# Patient Record
Sex: FEMALE | Race: BLACK OR AFRICAN AMERICAN | NOT HISPANIC OR LATINO
[De-identification: names, ages, dates, MRNs, and addresses within clinical notes are randomized per-mention and may not be internally consistent; named-entity substitution may affect disease eponyms.]

---

## 2017-08-10 ENCOUNTER — RESULT REVIEW (OUTPATIENT)
Age: 67
End: 2017-08-10

## 2018-08-06 ENCOUNTER — OUTPATIENT (OUTPATIENT)
Dept: OUTPATIENT SERVICES | Facility: HOSPITAL | Age: 68
LOS: 1 days | End: 2018-08-06
Payer: COMMERCIAL

## 2018-08-06 VITALS
TEMPERATURE: 98 F | HEIGHT: 66 IN | WEIGHT: 215.17 LBS | SYSTOLIC BLOOD PRESSURE: 139 MMHG | RESPIRATION RATE: 15 BRPM | OXYGEN SATURATION: 97 % | HEART RATE: 64 BPM | DIASTOLIC BLOOD PRESSURE: 87 MMHG

## 2018-08-06 VITALS — WEIGHT: 215.17 LBS | HEIGHT: 66 IN

## 2018-08-06 DIAGNOSIS — M25.9 JOINT DISORDER, UNSPECIFIED: Chronic | ICD-10-CM

## 2018-08-06 DIAGNOSIS — M77.31 CALCANEAL SPUR, RIGHT FOOT: ICD-10-CM

## 2018-08-06 DIAGNOSIS — Z98.89 OTHER SPECIFIED POSTPROCEDURAL STATES: Chronic | ICD-10-CM

## 2018-08-06 DIAGNOSIS — D16.31 BENIGN NEOPLASM OF SHORT BONES OF RIGHT LOWER LIMB: ICD-10-CM

## 2018-08-06 DIAGNOSIS — Z90.49 ACQUIRED ABSENCE OF OTHER SPECIFIED PARTS OF DIGESTIVE TRACT: Chronic | ICD-10-CM

## 2018-08-06 DIAGNOSIS — Z90.710 ACQUIRED ABSENCE OF BOTH CERVIX AND UTERUS: Chronic | ICD-10-CM

## 2018-08-06 DIAGNOSIS — M85.671 OTHER CYST OF BONE, RIGHT ANKLE AND FOOT: ICD-10-CM

## 2018-08-06 DIAGNOSIS — Z98.890 OTHER SPECIFIED POSTPROCEDURAL STATES: Chronic | ICD-10-CM

## 2018-08-06 DIAGNOSIS — Z01.818 ENCOUNTER FOR OTHER PREPROCEDURAL EXAMINATION: ICD-10-CM

## 2018-08-06 LAB
ALBUMIN SERPL ELPH-MCNC: 3.6 G/DL — SIGNIFICANT CHANGE UP (ref 3.3–5)
ALP SERPL-CCNC: 89 U/L — SIGNIFICANT CHANGE UP (ref 40–120)
ALT FLD-CCNC: 21 U/L DA — SIGNIFICANT CHANGE UP (ref 10–45)
ANION GAP SERPL CALC-SCNC: 5 MMOL/L — SIGNIFICANT CHANGE UP (ref 5–17)
AST SERPL-CCNC: 36 U/L — SIGNIFICANT CHANGE UP (ref 10–40)
BILIRUB SERPL-MCNC: 0.5 MG/DL — SIGNIFICANT CHANGE UP (ref 0.2–1.2)
BUN SERPL-MCNC: 21 MG/DL — SIGNIFICANT CHANGE UP (ref 7–23)
CALCIUM SERPL-MCNC: 9.8 MG/DL — SIGNIFICANT CHANGE UP (ref 8.4–10.5)
CHLORIDE SERPL-SCNC: 112 MMOL/L — HIGH (ref 96–108)
CO2 SERPL-SCNC: 26 MMOL/L — SIGNIFICANT CHANGE UP (ref 22–31)
CREAT SERPL-MCNC: 1.01 MG/DL — SIGNIFICANT CHANGE UP (ref 0.5–1.3)
GLUCOSE SERPL-MCNC: 79 MG/DL — SIGNIFICANT CHANGE UP (ref 70–99)
HCT VFR BLD CALC: 44.6 % — SIGNIFICANT CHANGE UP (ref 34.5–45)
HGB BLD-MCNC: 13.6 G/DL — SIGNIFICANT CHANGE UP (ref 11.5–15.5)
MCHC RBC-ENTMCNC: 27.7 PG — SIGNIFICANT CHANGE UP (ref 27–34)
MCHC RBC-ENTMCNC: 30.5 GM/DL — LOW (ref 32–36)
MCV RBC AUTO: 90.8 FL — SIGNIFICANT CHANGE UP (ref 80–100)
PLATELET # BLD AUTO: 183 K/UL — SIGNIFICANT CHANGE UP (ref 150–400)
POTASSIUM SERPL-MCNC: 6.2 MMOL/L — CRITICAL HIGH (ref 3.5–5.3)
POTASSIUM SERPL-SCNC: 6.2 MMOL/L — CRITICAL HIGH (ref 3.5–5.3)
PROT SERPL-MCNC: 8.4 G/DL — HIGH (ref 6–8.3)
RBC # BLD: 4.91 M/UL — SIGNIFICANT CHANGE UP (ref 3.8–5.2)
RBC # FLD: 14.3 % — SIGNIFICANT CHANGE UP (ref 10.3–14.5)
SODIUM SERPL-SCNC: 143 MMOL/L — SIGNIFICANT CHANGE UP (ref 135–145)
WBC # BLD: 6.2 K/UL — SIGNIFICANT CHANGE UP (ref 3.8–10.5)
WBC # FLD AUTO: 6.2 K/UL — SIGNIFICANT CHANGE UP (ref 3.8–10.5)

## 2018-08-06 PROCEDURE — 85027 COMPLETE CBC AUTOMATED: CPT

## 2018-08-06 PROCEDURE — 36415 COLL VENOUS BLD VENIPUNCTURE: CPT

## 2018-08-06 PROCEDURE — 93010 ELECTROCARDIOGRAM REPORT: CPT | Mod: NC

## 2018-08-06 PROCEDURE — G0463: CPT

## 2018-08-06 PROCEDURE — 80053 COMPREHEN METABOLIC PANEL: CPT

## 2018-08-06 PROCEDURE — 93005 ELECTROCARDIOGRAM TRACING: CPT

## 2018-08-06 RX ORDER — SODIUM CHLORIDE 9 MG/ML
1000 INJECTION, SOLUTION INTRAVENOUS
Qty: 0 | Refills: 0 | Status: DISCONTINUED | OUTPATIENT
Start: 2018-08-17 | End: 2018-08-17

## 2018-08-06 NOTE — H&P PST ADULT - ATTENDING COMMENTS
Physician Assistant Statement 8-17-18  I have personally interviewed the patient.  There have been no changes in the patient 's history or in her review of systems since her exam in [facility] 's and since her medical clearance.  KATJA Bowman P.A.-C

## 2018-08-06 NOTE — H&P PST ADULT - PROBLEM SELECTOR PLAN 1
Scheduled for retrocalcaneal spur resection right foot on 8/17/18.    Pre-op labs and EKG ordered. Obtain medical and pulmonary clearances.  Bring asthma inhalers. Follow PCP order regarding medication intake.

## 2018-08-06 NOTE — H&P PST ADULT - PSH
H/O abdominal hysterectomy  1998  H/O ventral hernia repair    History of bunionectomy of right great toe    S/P cholecystectomy    Shoulder problem  right shoulder repaired

## 2018-08-06 NOTE — H&P PST ADULT - FAMILY HISTORY
Mother  Still living? No  Family history of premature coronary heart disease, Age at diagnosis: Age Unknown

## 2018-08-06 NOTE — PROVIDER CONTACT NOTE (OTHER) - ACTION/TREATMENT ORDERED:
Spoke with Amber in Dr. Carias's office who will inform the doctor of blood being hemolized. Blood work to be repeated in doctor's office on 8/7/18. Spoke with Amber in Dr. Carias's office who will inform the doctor of blood being hemolized. Blood work to be repeated in doctor's office on 8/7/18.  8/15/18 repeat k done by PMD and resulted 4.7.

## 2018-08-06 NOTE — H&P PST ADULT - HISTORY OF PRESENT ILLNESS
68 y/o female presents to PST.  Reports right foot pain for over a year.  Pain radiates up right foot 8/10 and described as sharp.  Referred to Dr Larson by her podiatrist.  Diagnosed with right heel spur.  Scheduled for retrocalcaneal spur resection right foot on 8/17/18.

## 2018-08-06 NOTE — H&P PST ADULT - PMH
Asthma  3-4 x/months.  Last attack 2 weeks ago  Calcaneal spur of foot, right    DM (diabetes mellitus screen)    Gastroesophageal reflux disease, esophagitis presence not specified    Glaucoma of both eyes, unspecified glaucoma type    Hyperlipidemia, unspecified hyperlipidemia type    Hypertension, unspecified type    Sleep apnea, unspecified type

## 2018-08-16 ENCOUNTER — TRANSCRIPTION ENCOUNTER (OUTPATIENT)
Age: 68
End: 2018-08-16

## 2018-08-17 ENCOUNTER — OUTPATIENT (OUTPATIENT)
Dept: OUTPATIENT SERVICES | Facility: HOSPITAL | Age: 68
LOS: 1 days | End: 2018-08-17
Payer: COMMERCIAL

## 2018-08-17 ENCOUNTER — RESULT REVIEW (OUTPATIENT)
Age: 68
End: 2018-08-17

## 2018-08-17 VITALS
HEART RATE: 71 BPM | TEMPERATURE: 97 F | DIASTOLIC BLOOD PRESSURE: 72 MMHG | SYSTOLIC BLOOD PRESSURE: 106 MMHG | RESPIRATION RATE: 16 BRPM | OXYGEN SATURATION: 96 %

## 2018-08-17 VITALS
RESPIRATION RATE: 16 BRPM | HEART RATE: 70 BPM | SYSTOLIC BLOOD PRESSURE: 130 MMHG | DIASTOLIC BLOOD PRESSURE: 77 MMHG | HEIGHT: 66 IN | TEMPERATURE: 98 F | WEIGHT: 215.17 LBS | OXYGEN SATURATION: 98 %

## 2018-08-17 DIAGNOSIS — Z90.49 ACQUIRED ABSENCE OF OTHER SPECIFIED PARTS OF DIGESTIVE TRACT: Chronic | ICD-10-CM

## 2018-08-17 DIAGNOSIS — Z90.710 ACQUIRED ABSENCE OF BOTH CERVIX AND UTERUS: Chronic | ICD-10-CM

## 2018-08-17 DIAGNOSIS — M25.9 JOINT DISORDER, UNSPECIFIED: Chronic | ICD-10-CM

## 2018-08-17 DIAGNOSIS — D16.31 BENIGN NEOPLASM OF SHORT BONES OF RIGHT LOWER LIMB: ICD-10-CM

## 2018-08-17 DIAGNOSIS — M85.671 OTHER CYST OF BONE, RIGHT ANKLE AND FOOT: ICD-10-CM

## 2018-08-17 DIAGNOSIS — M77.31 CALCANEAL SPUR, RIGHT FOOT: ICD-10-CM

## 2018-08-17 DIAGNOSIS — Z98.42 CATARACT EXTRACTION STATUS, LEFT EYE: Chronic | ICD-10-CM

## 2018-08-17 DIAGNOSIS — Z98.41 CATARACT EXTRACTION STATUS, RIGHT EYE: Chronic | ICD-10-CM

## 2018-08-17 DIAGNOSIS — Z98.89 OTHER SPECIFIED POSTPROCEDURAL STATES: Chronic | ICD-10-CM

## 2018-08-17 DIAGNOSIS — Z98.890 OTHER SPECIFIED POSTPROCEDURAL STATES: Chronic | ICD-10-CM

## 2018-08-17 LAB
GLUCOSE BLDC GLUCOMTR-MCNC: 128 MG/DL — HIGH (ref 70–99)
GLUCOSE BLDC GLUCOMTR-MCNC: 161 MG/DL — HIGH (ref 70–99)

## 2018-08-17 PROCEDURE — C1713: CPT

## 2018-08-17 PROCEDURE — 88304 TISSUE EXAM BY PATHOLOGIST: CPT

## 2018-08-17 PROCEDURE — 76000 FLUOROSCOPY <1 HR PHYS/QHP: CPT

## 2018-08-17 PROCEDURE — 73620 X-RAY EXAM OF FOOT: CPT | Mod: 26,RT

## 2018-08-17 PROCEDURE — 88311 DECALCIFY TISSUE: CPT

## 2018-08-17 PROCEDURE — 73620 X-RAY EXAM OF FOOT: CPT

## 2018-08-17 PROCEDURE — 88311 DECALCIFY TISSUE: CPT | Mod: 26

## 2018-08-17 PROCEDURE — 28119 REMOVAL OF HEEL SPUR: CPT | Mod: RT

## 2018-08-17 PROCEDURE — 88304 TISSUE EXAM BY PATHOLOGIST: CPT | Mod: 26

## 2018-08-17 PROCEDURE — 82962 GLUCOSE BLOOD TEST: CPT

## 2018-08-17 RX ORDER — HYDROMORPHONE HYDROCHLORIDE 2 MG/ML
0.5 INJECTION INTRAMUSCULAR; INTRAVENOUS; SUBCUTANEOUS
Qty: 0 | Refills: 0 | Status: DISCONTINUED | OUTPATIENT
Start: 2018-08-17 | End: 2018-08-17

## 2018-08-17 RX ORDER — ONDANSETRON 8 MG/1
4 TABLET, FILM COATED ORAL ONCE
Qty: 0 | Refills: 0 | Status: DISCONTINUED | OUTPATIENT
Start: 2018-08-17 | End: 2018-08-17

## 2018-08-17 RX ORDER — HYDROMORPHONE HYDROCHLORIDE 2 MG/ML
1 INJECTION INTRAMUSCULAR; INTRAVENOUS; SUBCUTANEOUS
Qty: 0 | Refills: 0 | Status: DISCONTINUED | OUTPATIENT
Start: 2018-08-17 | End: 2018-08-17

## 2018-08-17 RX ORDER — SODIUM CHLORIDE 9 MG/ML
1000 INJECTION, SOLUTION INTRAVENOUS
Qty: 0 | Refills: 0 | Status: DISCONTINUED | OUTPATIENT
Start: 2018-08-17 | End: 2018-08-17

## 2018-08-17 RX ADMIN — SODIUM CHLORIDE 50 MILLILITER(S): 9 INJECTION, SOLUTION INTRAVENOUS at 07:40

## 2018-08-17 NOTE — BRIEF OPERATIVE NOTE - PROCEDURE
<<-----Click on this checkbox to enter Procedure Resection of spur of right calcaneus  08/17/2018    Active  EULOGIO

## 2018-08-17 NOTE — ASU PATIENT PROFILE, ADULT - PSH
H/O abdominal hysterectomy  1998  H/O ventral hernia repair    History of bunionectomy of right great toe    History of left cataract extraction    History of right cataract extraction    S/P cholecystectomy    Shoulder problem  right shoulder repaired

## 2018-08-17 NOTE — ASU DISCHARGE PLAN (ADULT/PEDIATRIC). - NOTIFY
Persistent Nausea and Vomiting/Numbness, color, or temperature change to extremity Pain not relieved by Medications/Fever greater than 101/Numbness, tingling/Numbness, color, or temperature change to extremity/Swelling that continues/Persistent Nausea and Vomiting

## 2018-08-17 NOTE — ASU DISCHARGE PLAN (ADULT/PEDIATRIC). - MEDICATION SUMMARY - MEDICATIONS TO TAKE
I will START or STAY ON the medications listed below when I get home from the hospital:    aspirin 81 mg oral tablet  -- 1 tab(s) by mouth once a day  -- Indication: For as per PCP    Cy Mitchellar  -- 52 unit(s) subcutaneous once a day (at bedtime)  -- Indication: For as per PCP    metFORMIN 1000 mg oral tablet  -- orally once a day (at bedtime)  -- Indication: For as per PCP    HumaLOG 100 units/mL subcutaneous solution  -- 8 unit(s) subcutaneous once a day  -- Indication: For as per PCP    HumaLOG 100 units/mL subcutaneous solution  -- 10 unit(s) subcutaneous once a day, dinner  -- Indication: For as per PCP    Jardiance 25 mg oral tablet  -- 1 tab(s) by mouth once a day (in the morning)  -- Indication: For as per PCP    Lipitor 20 mg oral tablet  -- 1 tab(s) by mouth once a day  -- Indication: For as per PCP    Lotrel  --  by mouth once a day  -- Indication: For as per PCP    metoprolol succinate 100 mg oral tablet, extended release  -- 1 tab(s) by mouth once a day  -- Indication: For as per PCP    Dulera 200 mcg-5 mcg/inh inhalation aerosol  -- 2 puff(s) inhaled 2 times a day  -- Indication: For as per PCP    ProAir HFA 90 mcg/inh inhalation aerosol  -- 2 puff(s) inhaled 4 times a day  -- Indication: For as per PCP    Lasix 40 mg oral tablet  -- 1 tab(s) by mouth once a day  -- Indication: For as per PCP    raNITIdine 300 mg oral capsule  -- 1 cap(s) by mouth once a day (at bedtime)  -- Indication: For as per PCP    montelukast 10 mg oral tablet  -- 1 tab(s) by mouth once a day  -- Indication: For as per PCP    Klor-Con M10 oral tablet, extended release  -- 1 tab(s) by mouth once a day (at bedtime)  -- Indication: For as per PCP    Fish Oil 1000 mg oral capsule  -- 1 cap(s) by mouth 2 times a day  -- Indication: For as per PCP    latanoprost 0.005% ophthalmic solution  -- 1 drop(s) to each affected eye once a day (in the evening)  -- Indication: For as per PCP    omeprazole 20 mg oral delayed release capsule  -- 1 cap(s) by mouth once a day  -- Indication: For as per PCP

## 2018-08-17 NOTE — ASU PREOP CHECKLIST - 1.
Popliteal nerve block by Anesthesia Popliteal nerve block by Anesthesia (see anesthesia record and additional time out OR sheet)

## 2018-08-22 LAB — SURGICAL PATHOLOGY STUDY: SIGNIFICANT CHANGE UP

## 2019-02-14 NOTE — H&P PST ADULT - PSYCHIATRIC
Problem: Patient Care Overview  Goal: Plan of Care Review  Outcome: Ongoing (interventions implemented as appropriate)   02/14/19 0235   Plan of Care Review   Progress improving   Coping/Psychosocial   Plan of Care Reviewed With   02/14/19 0235   Plan of Care Review   Progress improving   Coping/Psychosocial   Plan of Care Reviewed With patient    patient       Problem: Hip Arthroplasty (Total, Partial) (Adult)  Goal: Signs and Symptoms of Listed Potential Problems Will be Absent, Minimized or Managed (Hip Arthroplasty)  Outcome: Ongoing (interventions implemented as appropriate)   02/14/19 0235   Goal/Outcome Evaluation   Problems Assessed (Hip Arthroplasty) all   Problems Present (Hip Arthroplasty) pain;functional deficit          negative Affect and characteristics of appearance, verbalizations, behaviors are appropriate

## 2019-10-01 ENCOUNTER — INPATIENT (INPATIENT)
Facility: HOSPITAL | Age: 69
LOS: 3 days | Discharge: HOME | End: 2019-10-05
Attending: INTERNAL MEDICINE | Admitting: INTERNAL MEDICINE
Payer: COMMERCIAL

## 2019-10-01 VITALS
DIASTOLIC BLOOD PRESSURE: 74 MMHG | TEMPERATURE: 98 F | SYSTOLIC BLOOD PRESSURE: 143 MMHG | OXYGEN SATURATION: 99 % | RESPIRATION RATE: 18 BRPM | HEART RATE: 94 BPM

## 2019-10-01 DIAGNOSIS — M25.9 JOINT DISORDER, UNSPECIFIED: Chronic | ICD-10-CM

## 2019-10-01 DIAGNOSIS — Z90.710 ACQUIRED ABSENCE OF BOTH CERVIX AND UTERUS: Chronic | ICD-10-CM

## 2019-10-01 DIAGNOSIS — Z98.890 OTHER SPECIFIED POSTPROCEDURAL STATES: Chronic | ICD-10-CM

## 2019-10-01 DIAGNOSIS — Z98.89 OTHER SPECIFIED POSTPROCEDURAL STATES: Chronic | ICD-10-CM

## 2019-10-01 DIAGNOSIS — Z98.42 CATARACT EXTRACTION STATUS, LEFT EYE: Chronic | ICD-10-CM

## 2019-10-01 DIAGNOSIS — Z90.49 ACQUIRED ABSENCE OF OTHER SPECIFIED PARTS OF DIGESTIVE TRACT: Chronic | ICD-10-CM

## 2019-10-01 DIAGNOSIS — Z98.41 CATARACT EXTRACTION STATUS, RIGHT EYE: Chronic | ICD-10-CM

## 2019-10-01 RX ORDER — PANTOPRAZOLE SODIUM 20 MG/1
80 TABLET, DELAYED RELEASE ORAL ONCE
Refills: 0 | Status: COMPLETED | OUTPATIENT
Start: 2019-10-01 | End: 2019-10-02

## 2019-10-01 NOTE — ED PROVIDER NOTE - CLINICAL SUMMARY MEDICAL DECISION MAKING FREE TEXT BOX
69yF w/pmhx DM, HTN, HLD, GERD, asthma p/w abdominal pain and bloody bowel movements x last night. Concern for upper GI bleed, will check cbc/cmp/lipase, occult blood, CT abd/pelvis given abdominal pain to r/o colitis vs diverticulitis. Will give protonic 80mg 69yF w/pmhx DM, HTN, HLD, GERD, asthma p/w abdominal pain and bloody bowel movements x last night. Concern for GI bleed, gross blood on rectal exam. will check cbc/cmp/lipase, occult blood, CT abd/pelvis given abdominal pain to r/o colitis vs diverticulitis. Will give protonic 80mg loading dose

## 2019-10-01 NOTE — ED ADULT TRIAGE NOTE - CHIEF COMPLAINT QUOTE
Pt c/o lower abdominal pain, nausea/vomiting/diarrhea.  States symptoms for past 2 days.  Today believes saw some blood tinged emesis and blood in stool.  PMHx HLD, HTN, DM2, on ASA 81mg.  Hx stomach surgeries; hysterectomy & gall bladder removal.  Pt denies fevers/chills, recent travel, sick contacts.  Pt states was on antibiotic eyedrops last week for conjunctivitis, no oral antibiotic use.

## 2019-10-01 NOTE — ED PROVIDER NOTE - ATTENDING CONTRIBUTION TO CARE
Dr. Nye: I performed a face to face bedside interview with patient regarding history of present illness, review of symptoms and past medical history. I completed an independent physical exam.  I have discussed patient's plan of care with PA.   I agree with note as stated above, having amended the EMR as needed to reflect my findings.   This includes HISTORY OF PRESENT ILLNESS, HIV, PAST MEDICAL/SURGICAL/FAMILY/SOCIAL HISTORY, ALLERGIES AND HOME MEDICATIONS, REVIEW OF SYSTEMS, PHYSICAL EXAM, and any PROGRESS NOTES during the time I functioned as the attending physician for this patient.      69F with pmh of cholecystectomy, hysterecotmy, hernia repair p/w  diarrhea, vomiting, noted dark blood in stool and also

## 2019-10-01 NOTE — ED PROVIDER NOTE - OBJECTIVE STATEMENT
69yF w/pmhx DM, HTN, HLD, asthma, GERD p/w abdominal pain and BRBPR. Pt states last night she developed nausea, vomiting, diarrhea and lower abdominal pain. Pt states she has had multiple episodes of diarrhea with the most recent two being only blood, she states it is dark red in color without feces mixed in. She states she has had 3 total episodes of emesis and last episode tonight looked the same as her bloody BM. Pt states she has lower abdominal pain, crampy in nature. Associated she has dizziness/lightheadedness. Reports normal colonoscopy 3-4 years ago. Pt denies fever/chills, sick contacts, recent travel, back pain, chest pain, shortness of breath, palpitations, dysuria, urinary frequency or any other concerns.  Pt recently used abx drops for conjunctivitis, no oral abx 69yF w/pmhx DM, HTN, HLD, asthma, GERD p/w abdominal pain and bloody bowel movements. Pt states last night she developed nausea, vomiting, diarrhea and lower abdominal pain. Pt states she has had multiple episodes of diarrhea with the most recent two being only blood, she states it is dark red in color without feces mixed in. She states she has had 3 total episodes of emesis and last episode tonight looked the same as her bloody BM. Pt states she has lower abdominal pain, crampy in nature. Associated she has dizziness/lightheadedness. Reports normal colonoscopy 3-4 years ago. Pt denies fever/chills, sick contacts, recent travel, back pain, chest pain, shortness of breath, palpitations, dysuria, urinary frequency or any other concerns.  Pt recently used abx drops for conjunctivitis, no oral abx 69yF w/pmhx DM, HTN, HLD, asthma, GERD p/w abdominal pain and bloody bowel movements. Pt states last night she developed nausea, vomiting, diarrhea and lower abdominal pain. Pt states she has had multiple episodes of diarrhea with the most recent two being only blood, she states it is dark red in color without feces mixed in. She states she has had 3 total episodes of emesis and last episode tonight looked the same as her bloody BM. Pt states she has lower abdominal pain, crampy in nature. Associated she has dizziness/lightheadedness. Reports normal colonoscopy 3-4 years ago. Pt denies fever/chills, sick contacts, recent travel, back pain, chest pain, shortness of breath, palpitations, dysuria, urinary frequency or any other concerns.  Pt recently used abx drops for conjunctivitis?, no oral abx

## 2019-10-01 NOTE — ED PROVIDER NOTE - PROGRESS NOTE DETAILS
RONALDO Arredondo: Pt signed out to RONALDO Gunn pending lab and imaging results, pt to be admitted for GI bleed

## 2019-10-02 DIAGNOSIS — Z71.89 OTHER SPECIFIED COUNSELING: ICD-10-CM

## 2019-10-02 DIAGNOSIS — K52.9 NONINFECTIVE GASTROENTERITIS AND COLITIS, UNSPECIFIED: ICD-10-CM

## 2019-10-02 DIAGNOSIS — I10 ESSENTIAL (PRIMARY) HYPERTENSION: ICD-10-CM

## 2019-10-02 DIAGNOSIS — E66.9 OBESITY, UNSPECIFIED: ICD-10-CM

## 2019-10-02 DIAGNOSIS — G47.33 OBSTRUCTIVE SLEEP APNEA (ADULT) (PEDIATRIC): ICD-10-CM

## 2019-10-02 DIAGNOSIS — Z13.1 ENCOUNTER FOR SCREENING FOR DIABETES MELLITUS: ICD-10-CM

## 2019-10-02 DIAGNOSIS — J45.909 UNSPECIFIED ASTHMA, UNCOMPLICATED: ICD-10-CM

## 2019-10-02 PROBLEM — M77.31 CALCANEAL SPUR, RIGHT FOOT: Chronic | Status: ACTIVE | Noted: 2018-08-06

## 2019-10-02 PROBLEM — G47.30 SLEEP APNEA, UNSPECIFIED: Chronic | Status: ACTIVE | Noted: 2018-08-06

## 2019-10-02 PROBLEM — K21.9 GASTRO-ESOPHAGEAL REFLUX DISEASE WITHOUT ESOPHAGITIS: Chronic | Status: ACTIVE | Noted: 2018-08-06

## 2019-10-02 PROBLEM — E78.5 HYPERLIPIDEMIA, UNSPECIFIED: Chronic | Status: ACTIVE | Noted: 2018-08-06

## 2019-10-02 PROBLEM — H40.9 UNSPECIFIED GLAUCOMA: Chronic | Status: ACTIVE | Noted: 2018-08-06

## 2019-10-02 LAB
ALBUMIN SERPL ELPH-MCNC: 4.3 G/DL — SIGNIFICANT CHANGE UP (ref 3.3–5)
ALP SERPL-CCNC: 92 U/L — SIGNIFICANT CHANGE UP (ref 40–120)
ALT FLD-CCNC: 15 U/L — SIGNIFICANT CHANGE UP (ref 4–33)
ANION GAP SERPL CALC-SCNC: 17 MMO/L — HIGH (ref 7–14)
APTT BLD: 28.6 SEC — SIGNIFICANT CHANGE UP (ref 27.5–36.3)
AST SERPL-CCNC: 24 U/L — SIGNIFICANT CHANGE UP (ref 4–32)
BASE EXCESS BLDV CALC-SCNC: 1.2 MMOL/L — SIGNIFICANT CHANGE UP
BASOPHILS # BLD AUTO: 0.07 K/UL — SIGNIFICANT CHANGE UP (ref 0–0.2)
BASOPHILS NFR BLD AUTO: 0.6 % — SIGNIFICANT CHANGE UP (ref 0–2)
BILIRUB SERPL-MCNC: 0.4 MG/DL — SIGNIFICANT CHANGE UP (ref 0.2–1.2)
BLD GP AB SCN SERPL QL: NEGATIVE — SIGNIFICANT CHANGE UP
BLOOD GAS VENOUS - CREATININE: 1.22 MG/DL — SIGNIFICANT CHANGE UP (ref 0.5–1.3)
BLOOD GAS VENOUS - FIO2: 21 — SIGNIFICANT CHANGE UP
BUN SERPL-MCNC: 17 MG/DL — SIGNIFICANT CHANGE UP (ref 7–23)
CALCIUM SERPL-MCNC: 10.5 MG/DL — SIGNIFICANT CHANGE UP (ref 8.4–10.5)
CHLORIDE BLDV-SCNC: 112 MMOL/L — HIGH (ref 96–108)
CHLORIDE SERPL-SCNC: 108 MMOL/L — HIGH (ref 98–107)
CO2 SERPL-SCNC: 20 MMOL/L — LOW (ref 22–31)
CREAT SERPL-MCNC: 1.09 MG/DL — SIGNIFICANT CHANGE UP (ref 0.5–1.3)
EOSINOPHIL # BLD AUTO: 0.17 K/UL — SIGNIFICANT CHANGE UP (ref 0–0.5)
EOSINOPHIL NFR BLD AUTO: 1.4 % — SIGNIFICANT CHANGE UP (ref 0–6)
GAS PNL BLDV: 143 MMOL/L — SIGNIFICANT CHANGE UP (ref 136–146)
GLUCOSE BLDC GLUCOMTR-MCNC: 115 MG/DL — HIGH (ref 70–99)
GLUCOSE BLDC GLUCOMTR-MCNC: 117 MG/DL — HIGH (ref 70–99)
GLUCOSE BLDC GLUCOMTR-MCNC: 129 MG/DL — HIGH (ref 70–99)
GLUCOSE BLDC GLUCOMTR-MCNC: 168 MG/DL — HIGH (ref 70–99)
GLUCOSE BLDV-MCNC: 120 MG/DL — HIGH (ref 70–99)
GLUCOSE SERPL-MCNC: 155 MG/DL — HIGH (ref 70–99)
HCO3 BLDV-SCNC: 24 MMOL/L — SIGNIFICANT CHANGE UP (ref 20–27)
HCT VFR BLD CALC: 40 % — SIGNIFICANT CHANGE UP (ref 34.5–45)
HCT VFR BLD CALC: 43.7 % — SIGNIFICANT CHANGE UP (ref 34.5–45)
HCT VFR BLDV CALC: 41.7 % — SIGNIFICANT CHANGE UP (ref 34.5–45)
HGB BLD-MCNC: 13 G/DL — SIGNIFICANT CHANGE UP (ref 11.5–15.5)
HGB BLD-MCNC: 14 G/DL — SIGNIFICANT CHANGE UP (ref 11.5–15.5)
HGB BLDV-MCNC: 13.6 G/DL — SIGNIFICANT CHANGE UP (ref 11.5–15.5)
IMM GRANULOCYTES NFR BLD AUTO: 0.3 % — SIGNIFICANT CHANGE UP (ref 0–1.5)
INR BLD: 1.04 — SIGNIFICANT CHANGE UP (ref 0.88–1.17)
LACTATE BLDV-MCNC: 1.5 MMOL/L — SIGNIFICANT CHANGE UP (ref 0.5–2)
LIDOCAIN IGE QN: 33.4 U/L — SIGNIFICANT CHANGE UP (ref 7–60)
LYMPHOCYTES # BLD AUTO: 25.7 % — SIGNIFICANT CHANGE UP (ref 13–44)
LYMPHOCYTES # BLD AUTO: 3.02 K/UL — SIGNIFICANT CHANGE UP (ref 1–3.3)
MCHC RBC-ENTMCNC: 28.4 PG — SIGNIFICANT CHANGE UP (ref 27–34)
MCHC RBC-ENTMCNC: 28.5 PG — SIGNIFICANT CHANGE UP (ref 27–34)
MCHC RBC-ENTMCNC: 32 % — SIGNIFICANT CHANGE UP (ref 32–36)
MCHC RBC-ENTMCNC: 32.5 % — SIGNIFICANT CHANGE UP (ref 32–36)
MCV RBC AUTO: 87.5 FL — SIGNIFICANT CHANGE UP (ref 80–100)
MCV RBC AUTO: 88.8 FL — SIGNIFICANT CHANGE UP (ref 80–100)
MONOCYTES # BLD AUTO: 0.96 K/UL — HIGH (ref 0–0.9)
MONOCYTES NFR BLD AUTO: 8.2 % — SIGNIFICANT CHANGE UP (ref 2–14)
NEUTROPHILS # BLD AUTO: 7.48 K/UL — HIGH (ref 1.8–7.4)
NEUTROPHILS NFR BLD AUTO: 63.8 % — SIGNIFICANT CHANGE UP (ref 43–77)
NRBC # FLD: 0 K/UL — SIGNIFICANT CHANGE UP (ref 0–0)
NRBC # FLD: 0 K/UL — SIGNIFICANT CHANGE UP (ref 0–0)
OB PNL STL: POSITIVE — SIGNIFICANT CHANGE UP
PCO2 BLDV: 47 MMHG — SIGNIFICANT CHANGE UP (ref 41–51)
PH BLDV: 7.36 PH — SIGNIFICANT CHANGE UP (ref 7.32–7.43)
PLATELET # BLD AUTO: 227 K/UL — SIGNIFICANT CHANGE UP (ref 150–400)
PLATELET # BLD AUTO: 246 K/UL — SIGNIFICANT CHANGE UP (ref 150–400)
PMV BLD: 11.7 FL — SIGNIFICANT CHANGE UP (ref 7–13)
PMV BLD: 11.9 FL — SIGNIFICANT CHANGE UP (ref 7–13)
PO2 BLDV: 41 MMHG — HIGH (ref 35–40)
POTASSIUM BLDV-SCNC: 4.8 MMOL/L — HIGH (ref 3.4–4.5)
POTASSIUM SERPL-MCNC: 4.7 MMOL/L — SIGNIFICANT CHANGE UP (ref 3.5–5.3)
POTASSIUM SERPL-SCNC: 4.7 MMOL/L — SIGNIFICANT CHANGE UP (ref 3.5–5.3)
PROT SERPL-MCNC: 8 G/DL — SIGNIFICANT CHANGE UP (ref 6–8.3)
PROTHROM AB SERPL-ACNC: 11.6 SEC — SIGNIFICANT CHANGE UP (ref 9.8–13.1)
RBC # BLD: 4.57 M/UL — SIGNIFICANT CHANGE UP (ref 3.8–5.2)
RBC # BLD: 4.92 M/UL — SIGNIFICANT CHANGE UP (ref 3.8–5.2)
RBC # FLD: 15.6 % — HIGH (ref 10.3–14.5)
RBC # FLD: 15.6 % — HIGH (ref 10.3–14.5)
RH IG SCN BLD-IMP: POSITIVE — SIGNIFICANT CHANGE UP
SAO2 % BLDV: 69.6 % — SIGNIFICANT CHANGE UP (ref 60–85)
SODIUM SERPL-SCNC: 145 MMOL/L — SIGNIFICANT CHANGE UP (ref 135–145)
WBC # BLD: 10.2 K/UL — SIGNIFICANT CHANGE UP (ref 3.8–10.5)
WBC # BLD: 11.73 K/UL — HIGH (ref 3.8–10.5)
WBC # FLD AUTO: 10.2 K/UL — SIGNIFICANT CHANGE UP (ref 3.8–10.5)
WBC # FLD AUTO: 11.73 K/UL — HIGH (ref 3.8–10.5)

## 2019-10-02 PROCEDURE — 74177 CT ABD & PELVIS W/CONTRAST: CPT | Mod: 26

## 2019-10-02 RX ORDER — DEXTROSE 50 % IN WATER 50 %
12.5 SYRINGE (ML) INTRAVENOUS ONCE
Refills: 0 | Status: DISCONTINUED | OUTPATIENT
Start: 2019-10-02 | End: 2019-10-05

## 2019-10-02 RX ORDER — MORPHINE SULFATE 50 MG/1
2 CAPSULE, EXTENDED RELEASE ORAL ONCE
Refills: 0 | Status: DISCONTINUED | OUTPATIENT
Start: 2019-10-02 | End: 2019-10-05

## 2019-10-02 RX ORDER — INSULIN LISPRO 100/ML
4 VIAL (ML) SUBCUTANEOUS
Refills: 0 | Status: DISCONTINUED | OUTPATIENT
Start: 2019-10-02 | End: 2019-10-05

## 2019-10-02 RX ORDER — INFLUENZA VIRUS VACCINE 15; 15; 15; 15 UG/.5ML; UG/.5ML; UG/.5ML; UG/.5ML
0.5 SUSPENSION INTRAMUSCULAR ONCE
Refills: 0 | Status: DISCONTINUED | OUTPATIENT
Start: 2019-10-02 | End: 2019-10-05

## 2019-10-02 RX ORDER — ALBUTEROL 90 UG/1
2 AEROSOL, METERED ORAL EVERY 6 HOURS
Refills: 0 | Status: DISCONTINUED | OUTPATIENT
Start: 2019-10-02 | End: 2019-10-05

## 2019-10-02 RX ORDER — PANTOPRAZOLE SODIUM 20 MG/1
40 TABLET, DELAYED RELEASE ORAL
Refills: 0 | Status: DISCONTINUED | OUTPATIENT
Start: 2019-10-02 | End: 2019-10-02

## 2019-10-02 RX ORDER — LATANOPROST 0.05 MG/ML
1 SOLUTION/ DROPS OPHTHALMIC; TOPICAL AT BEDTIME
Refills: 0 | Status: DISCONTINUED | OUTPATIENT
Start: 2019-10-02 | End: 2019-10-05

## 2019-10-02 RX ORDER — METRONIDAZOLE 500 MG
500 TABLET ORAL EVERY 8 HOURS
Refills: 0 | Status: DISCONTINUED | OUTPATIENT
Start: 2019-10-02 | End: 2019-10-02

## 2019-10-02 RX ORDER — INSULIN GLARGINE 100 [IU]/ML
6 INJECTION, SOLUTION SUBCUTANEOUS AT BEDTIME
Refills: 0 | Status: DISCONTINUED | OUTPATIENT
Start: 2019-10-02 | End: 2019-10-05

## 2019-10-02 RX ORDER — ATORVASTATIN CALCIUM 80 MG/1
20 TABLET, FILM COATED ORAL AT BEDTIME
Refills: 0 | Status: DISCONTINUED | OUTPATIENT
Start: 2019-10-02 | End: 2019-10-05

## 2019-10-02 RX ORDER — FUROSEMIDE 40 MG
40 TABLET ORAL DAILY
Refills: 0 | Status: DISCONTINUED | OUTPATIENT
Start: 2019-10-02 | End: 2019-10-04

## 2019-10-02 RX ORDER — DEXTROSE 50 % IN WATER 50 %
25 SYRINGE (ML) INTRAVENOUS ONCE
Refills: 0 | Status: DISCONTINUED | OUTPATIENT
Start: 2019-10-02 | End: 2019-10-05

## 2019-10-02 RX ORDER — PIPERACILLIN AND TAZOBACTAM 4; .5 G/20ML; G/20ML
3.38 INJECTION, POWDER, LYOPHILIZED, FOR SOLUTION INTRAVENOUS ONCE
Refills: 0 | Status: COMPLETED | OUTPATIENT
Start: 2019-10-02 | End: 2019-10-02

## 2019-10-02 RX ORDER — SODIUM CHLORIDE 9 MG/ML
1000 INJECTION INTRAMUSCULAR; INTRAVENOUS; SUBCUTANEOUS
Refills: 0 | Status: DISCONTINUED | OUTPATIENT
Start: 2019-10-02 | End: 2019-10-03

## 2019-10-02 RX ORDER — MONTELUKAST 4 MG/1
10 TABLET, CHEWABLE ORAL DAILY
Refills: 0 | Status: DISCONTINUED | OUTPATIENT
Start: 2019-10-02 | End: 2019-10-05

## 2019-10-02 RX ORDER — DEXTROSE 50 % IN WATER 50 %
15 SYRINGE (ML) INTRAVENOUS ONCE
Refills: 0 | Status: DISCONTINUED | OUTPATIENT
Start: 2019-10-02 | End: 2019-10-05

## 2019-10-02 RX ORDER — CIPROFLOXACIN LACTATE 400MG/40ML
400 VIAL (ML) INTRAVENOUS ONCE
Refills: 0 | Status: COMPLETED | OUTPATIENT
Start: 2019-10-02 | End: 2019-10-02

## 2019-10-02 RX ORDER — METRONIDAZOLE 500 MG
500 TABLET ORAL ONCE
Refills: 0 | Status: COMPLETED | OUTPATIENT
Start: 2019-10-02 | End: 2019-10-02

## 2019-10-02 RX ORDER — BUDESONIDE AND FORMOTEROL FUMARATE DIHYDRATE 160; 4.5 UG/1; UG/1
2 AEROSOL RESPIRATORY (INHALATION)
Refills: 0 | Status: DISCONTINUED | OUTPATIENT
Start: 2019-10-02 | End: 2019-10-05

## 2019-10-02 RX ORDER — SODIUM CHLORIDE 9 MG/ML
1000 INJECTION, SOLUTION INTRAVENOUS
Refills: 0 | Status: DISCONTINUED | OUTPATIENT
Start: 2019-10-02 | End: 2019-10-05

## 2019-10-02 RX ORDER — MORPHINE SULFATE 50 MG/1
2 CAPSULE, EXTENDED RELEASE ORAL ONCE
Refills: 0 | Status: DISCONTINUED | OUTPATIENT
Start: 2019-10-02 | End: 2019-10-02

## 2019-10-02 RX ORDER — GLUCAGON INJECTION, SOLUTION 0.5 MG/.1ML
1 INJECTION, SOLUTION SUBCUTANEOUS ONCE
Refills: 0 | Status: DISCONTINUED | OUTPATIENT
Start: 2019-10-02 | End: 2019-10-05

## 2019-10-02 RX ORDER — PANTOPRAZOLE SODIUM 20 MG/1
40 TABLET, DELAYED RELEASE ORAL
Refills: 0 | Status: DISCONTINUED | OUTPATIENT
Start: 2019-10-02 | End: 2019-10-05

## 2019-10-02 RX ORDER — METOPROLOL TARTRATE 50 MG
100 TABLET ORAL DAILY
Refills: 0 | Status: DISCONTINUED | OUTPATIENT
Start: 2019-10-02 | End: 2019-10-05

## 2019-10-02 RX ORDER — METRONIDAZOLE 500 MG
TABLET ORAL
Refills: 0 | Status: DISCONTINUED | OUTPATIENT
Start: 2019-10-02 | End: 2019-10-05

## 2019-10-02 RX ORDER — INSULIN LISPRO 100/ML
VIAL (ML) SUBCUTANEOUS
Refills: 0 | Status: DISCONTINUED | OUTPATIENT
Start: 2019-10-02 | End: 2019-10-05

## 2019-10-02 RX ORDER — SOD SULF/SODIUM/NAHCO3/KCL/PEG
1 SOLUTION, RECONSTITUTED, ORAL ORAL EVERY 4 HOURS
Refills: 0 | Status: DISCONTINUED | OUTPATIENT
Start: 2019-10-02 | End: 2019-10-02

## 2019-10-02 RX ORDER — PIPERACILLIN AND TAZOBACTAM 4; .5 G/20ML; G/20ML
3.38 INJECTION, POWDER, LYOPHILIZED, FOR SOLUTION INTRAVENOUS EVERY 8 HOURS
Refills: 0 | Status: DISCONTINUED | OUTPATIENT
Start: 2019-10-02 | End: 2019-10-02

## 2019-10-02 RX ORDER — METRONIDAZOLE 500 MG
500 TABLET ORAL EVERY 8 HOURS
Refills: 0 | Status: DISCONTINUED | OUTPATIENT
Start: 2019-10-02 | End: 2019-10-05

## 2019-10-02 RX ORDER — ASPIRIN/CALCIUM CARB/MAGNESIUM 324 MG
81 TABLET ORAL DAILY
Refills: 0 | Status: DISCONTINUED | OUTPATIENT
Start: 2019-10-02 | End: 2019-10-02

## 2019-10-02 RX ORDER — CIPROFLOXACIN LACTATE 400MG/40ML
400 VIAL (ML) INTRAVENOUS EVERY 12 HOURS
Refills: 0 | Status: DISCONTINUED | OUTPATIENT
Start: 2019-10-02 | End: 2019-10-05

## 2019-10-02 RX ADMIN — Medication 1: at 18:46

## 2019-10-02 RX ADMIN — Medication 200 MILLIGRAM(S): at 18:45

## 2019-10-02 RX ADMIN — INSULIN GLARGINE 6 UNIT(S): 100 INJECTION, SOLUTION SUBCUTANEOUS at 22:36

## 2019-10-02 RX ADMIN — PIPERACILLIN AND TAZOBACTAM 200 GRAM(S): 4; .5 INJECTION, POWDER, LYOPHILIZED, FOR SOLUTION INTRAVENOUS at 12:19

## 2019-10-02 RX ADMIN — BUDESONIDE AND FORMOTEROL FUMARATE DIHYDRATE 2 PUFF(S): 160; 4.5 AEROSOL RESPIRATORY (INHALATION) at 22:35

## 2019-10-02 RX ADMIN — Medication 100 MILLIGRAM(S): at 16:30

## 2019-10-02 RX ADMIN — ATORVASTATIN CALCIUM 20 MILLIGRAM(S): 80 TABLET, FILM COATED ORAL at 22:36

## 2019-10-02 RX ADMIN — LATANOPROST 1 DROP(S): 0.05 SOLUTION/ DROPS OPHTHALMIC; TOPICAL at 22:35

## 2019-10-02 RX ADMIN — Medication 40 MILLIGRAM(S): at 12:19

## 2019-10-02 RX ADMIN — Medication 100 MILLIGRAM(S): at 22:35

## 2019-10-02 RX ADMIN — MORPHINE SULFATE 2 MILLIGRAM(S): 50 CAPSULE, EXTENDED RELEASE ORAL at 03:55

## 2019-10-02 RX ADMIN — SODIUM CHLORIDE 125 MILLILITER(S): 9 INJECTION INTRAMUSCULAR; INTRAVENOUS; SUBCUTANEOUS at 07:10

## 2019-10-02 RX ADMIN — Medication 100 MILLIGRAM(S): at 07:40

## 2019-10-02 RX ADMIN — MONTELUKAST 10 MILLIGRAM(S): 4 TABLET, CHEWABLE ORAL at 12:19

## 2019-10-02 RX ADMIN — PANTOPRAZOLE SODIUM 80 MILLIGRAM(S): 20 TABLET, DELAYED RELEASE ORAL at 03:55

## 2019-10-02 RX ADMIN — PANTOPRAZOLE SODIUM 40 MILLIGRAM(S): 20 TABLET, DELAYED RELEASE ORAL at 18:45

## 2019-10-02 RX ADMIN — MORPHINE SULFATE 2 MILLIGRAM(S): 50 CAPSULE, EXTENDED RELEASE ORAL at 04:30

## 2019-10-02 RX ADMIN — Medication 200 MILLIGRAM(S): at 06:50

## 2019-10-02 NOTE — H&P ADULT - NSICDXPASTMEDICALHX_GEN_ALL_CORE_FT
PAST MEDICAL HISTORY:  Asthma 3-4 x/months.  Last attack 2 weeks ago    Calcaneal spur of foot, right     DM (diabetes mellitus screen)     Gastroesophageal reflux disease, esophagitis presence not specified     Glaucoma of both eyes, unspecified glaucoma type     Hyperlipidemia, unspecified hyperlipidemia type     Hypertension, unspecified type     Sleep apnea, unspecified type

## 2019-10-02 NOTE — H&P ADULT - ASSESSMENT
69yF w/pmhx DM, HTN, HLD, asthma, GERD p/w abdominal pain and bloody bowel movements. Pt states last night she developed nausea, vomiting, diarrhea and lower abdominal pain. Pt states she has had multiple episodes of diarrhea with the most recent two being only blood, she states it is dark red in color without feces mixed in. She states she has had 3 total episodes of emesis and last episode tonight looked the same as her bloody BM. Pt states she has lower abdominal pain, crampy in nature. Associated she has dizziness/lightheadedness. Reports normal colonoscopy 3-4 years ago. Pt denies fever/chills, sick contacts, recent travel, back pain, chest pain, shortness of breath, palpitations, dysuria, urinary frequency or any other concerns.

## 2019-10-02 NOTE — CONSULT NOTE ADULT - SUBJECTIVE AND OBJECTIVE BOX
HPI:  69yF w/pmhx DM, HTN, HLD, asthma, GERD p/w abdominal pain and bloody bowel movements. Pt states last night she developed nausea, vomiting, diarrhea and lower abdominal pain. Pt states she has had multiple episodes of diarrhea with the most recent two being only blood, she states it is dark red in color without feces mixed in. She states she has had 3 total episodes of emesis and last episode tonight looked the same as her bloody BM. Pt states she has lower abdominal pain, crampy in nature. Associated she has dizziness/lightheadedness. Reports normal colonoscopy 3-4 years ago. Pt denies fever/chills, sick contacts, recent travel, back pain, chest pain, shortness of breath, palpitations, dysuria, urinary frequency or any other concerns. (02 Oct 2019 11:29)  Patient has history of diabetes, on oral meds and on insulin at home, no recent hypoglycemic episodes, no polyuria polydipsia. Patient follows up with PCP for diabetes management.    PAST MEDICAL & SURGICAL HISTORY:  Glaucoma of both eyes, unspecified glaucoma type  Sleep apnea, unspecified type  Calcaneal spur of foot, right  Asthma: 3-4 x/months.  Last attack 2 weeks ago  Gastroesophageal reflux disease, esophagitis presence not specified  Hyperlipidemia, unspecified hyperlipidemia type  Hypertension, unspecified type  DM (diabetes mellitus screen)  History of right cataract extraction  History of left cataract extraction  Shoulder problem: right shoulder repaired  History of bunionectomy of right great toe  H/O abdominal hysterectomy: 1998  H/O ventral hernia repair  S/P cholecystectomy      FAMILY HISTORY:  Family history of premature coronary heart disease      Social History:    Outpatient Medications:    MEDICATIONS  (STANDING):  atorvastatin 20 milliGRAM(s) Oral at bedtime  buDESOnide  80 MICROgram(s)/formoterol 4.5 MICROgram(s) Inhaler 2 Puff(s) Inhalation two times a day  ciprofloxacin   IVPB 400 milliGRAM(s) IV Intermittent every 12 hours  dextrose 5%. 1000 milliLiter(s) (50 mL/Hr) IV Continuous <Continuous>  dextrose 50% Injectable 12.5 Gram(s) IV Push once  dextrose 50% Injectable 25 Gram(s) IV Push once  dextrose 50% Injectable 25 Gram(s) IV Push once  furosemide    Tablet 40 milliGRAM(s) Oral daily  insulin glargine Injectable (LANTUS) 6 Unit(s) SubCutaneous at bedtime  insulin lispro (HumaLOG) corrective regimen sliding scale   SubCutaneous three times a day before meals  insulin lispro Injectable (HumaLOG) 4 Unit(s) SubCutaneous three times a day before meals  latanoprost 0.005% Ophthalmic Solution 1 Drop(s) Both EYES at bedtime  metoprolol succinate  milliGRAM(s) Oral daily  metroNIDAZOLE  IVPB      metroNIDAZOLE  IVPB 500 milliGRAM(s) IV Intermittent every 8 hours  montelukast 10 milliGRAM(s) Oral daily  morphine  - Injectable 2 milliGRAM(s) IV Push Once  pantoprazole  Injectable 40 milliGRAM(s) IV Push two times a day  sodium chloride 0.9%. 1000 milliLiter(s) (125 mL/Hr) IV Continuous <Continuous>    MEDICATIONS  (PRN):  ALBUTerol    90 MICROgram(s) HFA Inhaler 2 Puff(s) Inhalation every 6 hours PRN Bronchospasm  dextrose 40% Gel 15 Gram(s) Oral once PRN Blood Glucose LESS THAN 70 milliGRAM(s)/deciliter  glucagon  Injectable 1 milliGRAM(s) IntraMuscular once PRN Glucose LESS THAN 70 milligrams/deciliter      Allergies    No Known Allergies    Intolerances      Review of Systems:  Constitutional: No fever, no chills  Eyes: No blurry vision  Neuro: No tremors  HEENT: No pain, no neck swelling  Cardiovascular: No chest pain, no palpitations  Respiratory: Has SOB, no cough  GI: No nausea, vomiting, abdominal pain  : No dysuria  Skin: no rash  MSK: Has leg swelling.  Psych: no depression  Endocrine: no polyuria, polydipsia    ALL OTHER SYSTEMS REVIEWED AND NEGATIVE    UNABLE TO OBTAIN    PHYSICAL EXAM:  VITALS: T(C): 36.4 (10-02-19 @ 15:10)  T(F): 97.5 (10-02-19 @ 15:10), Max: 98.3 (10-02-19 @ 03:49)  HR: 78 (10-02-19 @ 15:10) (75 - 94)  BP: 130/66 (10-02-19 @ 15:10) (109/60 - 143/74)  RR:  (16 - 18)  SpO2:  (99% - 100%)  Wt(kg): --  GENERAL: NAD, well-groomed, well-developed  EYES: No proptosis, no lid lag  HEENT:  Atraumatic, Normocephalic  THYROID: Normal size, no palpable nodules  RESPIRATORY: Clear to auscultation bilaterally; No rales, rhonchi, wheezing  CARDIOVASCULAR: Si S2, No murmurs;  GI: Soft, non distended, normal bowel sounds  SKIN: Dry, intact, No rashes or lesions  MUSCULOSKELETAL: Has BL lower extremity edema.  NEURO:  no tremor, sensation decreased in feet BL,    POCT Blood Glucose.: 115 mg/dL (10-02-19 @ 13:22)  POCT Blood Glucose.: 129 mg/dL (10-02-19 @ 08:45)  POCT Blood Glucose.: 211 mg/dL (10-01-19 @ 21:38)                            13.0   10.20 )-----------( 227      ( 02 Oct 2019 07:10 )             40.0       10-02    145  |  108<H>  |  17  ----------------------------<  155<H>  4.7   |  20<L>  |  1.09    EGFR if : 60  EGFR if non : 52    Ca    10.5      10-02    TPro  8.0  /  Alb  4.3  /  TBili  0.4  /  DBili  x   /  AST  24  /  ALT  15  /  AlkPhos  92  10-02      Thyroid Function Tests:              Radiology:

## 2019-10-02 NOTE — CONSULT NOTE ADULT - SUBJECTIVE AND OBJECTIVE BOX
Chief Complaint:  Patient is a 69y old  Female who presents with a chief complaint of abdominal pain (02 Oct 2019 11:29)    Glaucoma of both eyes, unspecified glaucoma type  Sleep apnea, unspecified type  Calcaneal spur of foot, right  Asthma  Gastroesophageal reflux disease, esophagitis presence not specified  Hyperlipidemia, unspecified hyperlipidemia type  Hypertension, unspecified type  DM (diabetes mellitus screen)  History of right cataract extraction  History of left cataract extraction  Shoulder problem  History of bunionectomy of right great toe  H/O abdominal hysterectomy  H/O ventral hernia repair  S/P cholecystectomy  No significant past surgical history     HPI:  69yF w/pmhx DM, HTN, HLD, asthma, GERD p/w abdominal pain and bloody bowel movements. Pt states last night she developed nausea, vomiting, diarrhea and lower abdominal pain. Pt states she has had multiple episodes of diarrhea with the most recent two being only blood, she states it is dark red in color without feces mixed in. She states she has had 3 total episodes of emesis and last episode tonight looked the same as her bloody BM. Pt states she has lower abdominal pain, crampy in nature. Associated she has dizziness/lightheadedness. Reports normal colonoscopy 3-4 years ago. Pt denies fever/chills, sick contacts, recent travel, back pain, chest pain, shortness of breath, palpitations, dysuria, urinary frequency or any other concerns.    No Known Allergies      ALBUTerol    90 MICROgram(s) HFA Inhaler 2 Puff(s) Inhalation every 6 hours PRN  atorvastatin 20 milliGRAM(s) Oral at bedtime  buDESOnide  80 MICROgram(s)/formoterol 4.5 MICROgram(s) Inhaler 2 Puff(s) Inhalation two times a day  dextrose 40% Gel 15 Gram(s) Oral once PRN  dextrose 5%. 1000 milliLiter(s) IV Continuous <Continuous>  dextrose 50% Injectable 12.5 Gram(s) IV Push once  dextrose 50% Injectable 25 Gram(s) IV Push once  dextrose 50% Injectable 25 Gram(s) IV Push once  furosemide    Tablet 40 milliGRAM(s) Oral daily  glucagon  Injectable 1 milliGRAM(s) IntraMuscular once PRN  insulin lispro (HumaLOG) corrective regimen sliding scale   SubCutaneous three times a day before meals  latanoprost 0.005% Ophthalmic Solution 1 Drop(s) Both EYES at bedtime  metoprolol succinate  milliGRAM(s) Oral daily  montelukast 10 milliGRAM(s) Oral daily  pantoprazole    Tablet 40 milliGRAM(s) Oral before breakfast  piperacillin/tazobactam IVPB. 3.375 Gram(s) IV Intermittent once  piperacillin/tazobactam IVPB.. 3.375 Gram(s) IV Intermittent every 8 hours  sodium chloride 0.9%. 1000 milliLiter(s) IV Continuous <Continuous>        FAMILY HISTORY:  Family history of premature coronary heart disease        Review of Systems:    General:  No wt loss, fevers, chills, night sweats, fatigue  Eyes:  Good vision, no reported pain  ENT:  No sore throat, pain, runny nose, dysphagia  CV:  No pain, palpitations, no lightheadedness  Resp:  No dyspnea, cough, tachypnea, wheezing  GI: bloody loose stools; remainder as above  :  No pain, bleeding, incontinence, nocturia  Muscle:  No pain, weakness  Neuro:  No weakness, tingling, memory problems  Psych:  No fatigue, insomnia, mood problems, depression  Endocrine:  No polyuria, polydypsia, cold/heat intolerance  Heme:  No petechiae, ecchymosis, easy bruisability  Skin:  No rash, tattoos, scars, edema    Relevant Family History:   n/c    Relevant Social History: n/c      Physical Exam:    Vital Signs:  Vital Signs Last 24 Hrs  T(C): 36.7 (02 Oct 2019 05:24), Max: 36.8 (02 Oct 2019 03:49)  T(F): 98.1 (02 Oct 2019 05:24), Max: 98.3 (02 Oct 2019 03:49)  HR: 75 (02 Oct 2019 05:24) (75 - 94)  BP: 109/60 (02 Oct 2019 05:24) (109/60 - 143/74)  BP(mean): --  RR: 16 (02 Oct 2019 05:24) (16 - 18)  SpO2: 99% (02 Oct 2019 05:24) (99% - 100%)  Daily     Daily     General:  Appears stated age, well-groomed, nad  HEENT:  NC/AT,  conjunctivae clear and pink, no thyromegaly, nodules, adenopathy, no JVD  Chest:  Full & symmetric excursion, no increased effort, breath sounds clear  Cardiovascular:  Regular rhythm, S1, S2, no murmur/rub/S3/S4, no abdominal bruit, no edema  Abdomen:  Soft, +ttp lower abd, non-distended, normoactive bowel sounds,  no masses ,no hepatosplenomeagaly, no signs of chronic liver disease  Extremities:  no cyanosis,clubbing or edema  Skin:  No rash/erythema/ecchymoses/petechiae/wounds/abscess/warm/dry  Neuro/Psych:  A&Ox3  , no asterixis, no tremor, no encephalopathy    Laboratory:                            13.0   10.20 )-----------( 227      ( 02 Oct 2019 07:10 )             40.0     10-02    145  |  108<H>  |  17  ----------------------------<  155<H>  4.7   |  20<L>  |  1.09    Ca    10.5      02 Oct 2019 00:15    TPro  8.0  /  Alb  4.3  /  TBili  0.4  /  DBili  x   /  AST  24  /  ALT  15  /  AlkPhos  92  10-02    LIVER FUNCTIONS - ( 02 Oct 2019 00:15 )  Alb: 4.3 g/dL / Pro: 8.0 g/dL / ALK PHOS: 92 u/L / ALT: 15 u/L / AST: 24 u/L / GGT: x           PT/INR - ( 02 Oct 2019 03:00 )   PT: 11.6 SEC;   INR: 1.04          PTT - ( 02 Oct 2019 03:00 )  PTT:28.6 SEC    Amylase Serum--      Lipase serum33.4       Ammonia--    Imaging:    < from: CT Abdomen and Pelvis w/ IV Cont (10.02.19 @ 03:34) >    EXAM:  CT ABDOMEN AND PELVIS IC        PROCEDURE DATE:  Oct  2 2019         INTERPRETATION:  CLINICAL INFORMATION: Lower abdominal pain with nausea   and vomiting.    COMPARISON: CT abdomen pelvis 2/27/2016.    PROCEDURE: CT of the Abdomen and Pelvis was performed with intravenous   contrast.   Intravenous contrast: 90 ml Omnipaque 350. 10 ml discarded.  Oral contrast: None.  Sagittal and coronal reformats were performed.    FINDINGS:    LOWER CHEST: Within normal limits.  LIVER: Within normal limits.  BILE DUCTS: Normal caliber.  GALLBLADDER: Cholecystectomy.    SPLEEN: Within normal limits.  PANCREAS: Within normal limits.  ADRENALS: Within normal limits.    KIDNEYS/URETERS: Within normal limits.  BLADDER: Within normal limits.  REPRODUCTIVE ORGANS: Hysterectomy.    BOWEL: Long segment thickening of the colon extending from the mid   transverse to the mid descending colon with adjacent fatty   stranding.Colonic diverticulosis without diverticulitis.No bowel   obstruction. Normal appendix.   PERITONEUM: No free air.    VESSELS: Atherosclerotic changes.   RETROPERITONEUM/LYMPH NODES: No lymphadenopathy.    ABDOMINAL WALL: Ventral abdominal wall hernia mesh repair.  BONES: Multilevel thoracic spondylosis.    IMPRESSION:     Long segment thickening of the colon centered on the splenic flexure   extending from the mid transverse colon to the mid descending colon due   to infectious or inflammatory colitis. Due to the watershed location   however, ischemic colitis should be considered.                  NAHID IQBAL M.D., RADIOLOGY RESIDENT  This document has been electronically signed.  SHAWN TEJEDA M.D., ATTENDING RADIOLOGIST  This document has been electronically signed. Oct  2 2019  5:47AM                  < end of copied text > Chief Complaint:  Patient is a 69y old  Female who presents with a chief complaint of abdominal pain (02 Oct 2019 11:29)    Glaucoma of both eyes, unspecified glaucoma type  Sleep apnea, unspecified type  Calcaneal spur of foot, right  Asthma  Gastroesophageal reflux disease, esophagitis presence not specified  Hyperlipidemia, unspecified hyperlipidemia type  Hypertension, unspecified type  DM (diabetes mellitus screen)  History of right cataract extraction  History of left cataract extraction  Shoulder problem  History of bunionectomy of right great toe  H/O abdominal hysterectomy  H/O ventral hernia repair  S/P cholecystectomy  No significant past surgical history     HPI:  69yF w/pmhx DM, HTN, HLD, asthma, GERD p/w abdominal pain and bloody bowel movements. Pt states last night she developed nausea, vomiting, diarrhea and lower abdominal pain. Pt states she has had multiple episodes of diarrhea with the most recent two being only blood, she states it is dark red in color without feces mixed in. She states she has had 3 total episodes of emesis and last episode tonight looked the same as her bloody BM. Pt states she has lower abdominal pain, crampy in nature. Associated she has dizziness/lightheadedness. Reports normal colonoscopy 3-4 years ago. Pt denies fever/chills, sick contacts, recent travel, back pain, chest pain, shortness of breath, palpitations, dysuria, urinary frequency or any other concerns. GI consulted for colitis with bloody stools. The patient reports on Saturday she went out for her birthday to a [employer] restaurant where she ate a Shrimp dish. She felt fine after and was without any diarrhea until yesterday. She reports that last night she got nauseated with associated vomiting, first it was food filled vomit and overtime changed to food mixed with some blood. Additionally she had lower abd cramping with diarrhea that was mixed with dark red stools then changed mainly loose stools with associated blood. She had a colonoscopy about 3-4 years ago with a polypectomy and an EGD about 5 years ago that was negative. This is the first time that she has ever noted blood in her stools. She endorses that she is occasionally constipated but relieved with otc medications.     No Known Allergies      ALBUTerol    90 MICROgram(s) HFA Inhaler 2 Puff(s) Inhalation every 6 hours PRN  atorvastatin 20 milliGRAM(s) Oral at bedtime  buDESOnide  80 MICROgram(s)/formoterol 4.5 MICROgram(s) Inhaler 2 Puff(s) Inhalation two times a day  dextrose 40% Gel 15 Gram(s) Oral once PRN  dextrose 5%. 1000 milliLiter(s) IV Continuous <Continuous>  dextrose 50% Injectable 12.5 Gram(s) IV Push once  dextrose 50% Injectable 25 Gram(s) IV Push once  dextrose 50% Injectable 25 Gram(s) IV Push once  furosemide    Tablet 40 milliGRAM(s) Oral daily  glucagon  Injectable 1 milliGRAM(s) IntraMuscular once PRN  insulin lispro (HumaLOG) corrective regimen sliding scale   SubCutaneous three times a day before meals  latanoprost 0.005% Ophthalmic Solution 1 Drop(s) Both EYES at bedtime  metoprolol succinate  milliGRAM(s) Oral daily  montelukast 10 milliGRAM(s) Oral daily  pantoprazole    Tablet 40 milliGRAM(s) Oral before breakfast  piperacillin/tazobactam IVPB. 3.375 Gram(s) IV Intermittent once  piperacillin/tazobactam IVPB.. 3.375 Gram(s) IV Intermittent every 8 hours  sodium chloride 0.9%. 1000 milliLiter(s) IV Continuous <Continuous>        FAMILY HISTORY:  Family history of premature coronary heart disease        Review of Systems:    General:  No wt loss, fevers, chills, night sweats, fatigue  Eyes:  Good vision, no reported pain  ENT:  No sore throat, pain, runny nose, dysphagia  CV:  No pain, palpitations, no lightheadedness  Resp:  No dyspnea, cough, tachypnea, wheezing  GI: bloody loose stools; remainder as above  :  No pain, bleeding, incontinence, nocturia  Muscle:  No pain, weakness  Neuro:  No weakness, tingling, memory problems  Psych:  No fatigue, insomnia, mood problems, depression  Endocrine:  No polyuria, polydypsia, cold/heat intolerance  Heme:  No petechiae, ecchymosis, easy bruisability  Skin:  No rash, tattoos, scars, edema    Relevant Family History:   n/c    Relevant Social History: n/c      Physical Exam:    Vital Signs:  Vital Signs Last 24 Hrs  T(C): 36.7 (02 Oct 2019 05:24), Max: 36.8 (02 Oct 2019 03:49)  T(F): 98.1 (02 Oct 2019 05:24), Max: 98.3 (02 Oct 2019 03:49)  HR: 75 (02 Oct 2019 05:24) (75 - 94)  BP: 109/60 (02 Oct 2019 05:24) (109/60 - 143/74)  BP(mean): --  RR: 16 (02 Oct 2019 05:24) (16 - 18)  SpO2: 99% (02 Oct 2019 05:24) (99% - 100%)  Daily     Daily     General:  Appears stated age, well-groomed, nad  HEENT:  NC/AT,  conjunctivae clear and pink, no thyromegaly, nodules, adenopathy, no JVD  Chest:  Full & symmetric excursion, no increased effort, breath sounds clear  Cardiovascular:  Regular rhythm, S1, S2, no murmur/rub/S3/S4, no abdominal bruit, no edema  Abdomen:  Soft, +ttp lower abd, non-distended, normoactive bowel sounds,  no masses ,no hepatosplenomeagaly, no signs of chronic liver disease  Extremities:  no cyanosis,clubbing or edema  Skin:  No rash/erythema/ecchymoses/petechiae/wounds/abscess/warm/dry  Neuro/Psych:  A&Ox3  , no asterixis, no tremor, no encephalopathy    Laboratory:                            13.0   10.20 )-----------( 227      ( 02 Oct 2019 07:10 )             40.0     10-02    145  |  108<H>  |  17  ----------------------------<  155<H>  4.7   |  20<L>  |  1.09    Ca    10.5      02 Oct 2019 00:15    TPro  8.0  /  Alb  4.3  /  TBili  0.4  /  DBili  x   /  AST  24  /  ALT  15  /  AlkPhos  92  10-02    LIVER FUNCTIONS - ( 02 Oct 2019 00:15 )  Alb: 4.3 g/dL / Pro: 8.0 g/dL / ALK PHOS: 92 u/L / ALT: 15 u/L / AST: 24 u/L / GGT: x           PT/INR - ( 02 Oct 2019 03:00 )   PT: 11.6 SEC;   INR: 1.04          PTT - ( 02 Oct 2019 03:00 )  PTT:28.6 SEC    Amylase Serum--      Lipase serum33.4       Ammonia--    Imaging:    < from: CT Abdomen and Pelvis w/ IV Cont (10.02.19 @ 03:34) >    EXAM:  CT ABDOMEN AND PELVIS IC        PROCEDURE DATE:  Oct  2 2019         INTERPRETATION:  CLINICAL INFORMATION: Lower abdominal pain with nausea   and vomiting.    COMPARISON: CT abdomen pelvis 2/27/2016.    PROCEDURE: CT of the Abdomen and Pelvis was performed with intravenous   contrast.   Intravenous contrast: 90 ml Omnipaque 350. 10 ml discarded.  Oral contrast: None.  Sagittal and coronal reformats were performed.    FINDINGS:    LOWER CHEST: Within normal limits.  LIVER: Within normal limits.  BILE DUCTS: Normal caliber.  GALLBLADDER: Cholecystectomy.    SPLEEN: Within normal limits.  PANCREAS: Within normal limits.  ADRENALS: Within normal limits.    KIDNEYS/URETERS: Within normal limits.  BLADDER: Within normal limits.  REPRODUCTIVE ORGANS: Hysterectomy.    BOWEL: Long segment thickening of the colon extending from the mid   transverse to the mid descending colon with adjacent fatty   stranding.Colonic diverticulosis without diverticulitis.No bowel   obstruction. Normal appendix.   PERITONEUM: No free air.    VESSELS: Atherosclerotic changes.   RETROPERITONEUM/LYMPH NODES: No lymphadenopathy.    ABDOMINAL WALL: Ventral abdominal wall hernia mesh repair.  BONES: Multilevel thoracic spondylosis.    IMPRESSION:     Long segment thickening of the colon centered on the splenic flexure   extending from the mid transverse colon to the mid descending colon due   to infectious or inflammatory colitis. Due to the watershed location   however, ischemic colitis should be considered.                  NAHID IQBAL M.D., RADIOLOGY RESIDENT  This document has been electronically signed.  SHAWN TEJEDA M.D., ATTENDING RADIOLOGIST  This document has been electronically signed. Oct  2 2019  5:47AM                  < end of copied text >

## 2019-10-02 NOTE — PATIENT PROFILE ADULT - INDICATE TYPE
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      PROCEDURE:  Hysterectomy, bilateral salpingectomy and cystoscopy.      ATTENDING:  Benja Mace MD      ASSISTING:  Nba       ANESTHESIA:  None.      ESTIMATED BLOOD LOSS:  2500 mL.      INTRAVENOUS FLUIDS:  Include 2200 crystalloid, 250 albumin.      URINE OUTPUT:  Approximately 400 mL.      TUBES AND DRAINS:  Include Crowley.      DESCRIPTION OF PROCEDURE:  The patient at the time of our procedure had undergone a   section under the care of Dr. Rain.  The hysterotomy site had been closed and though there was some bleeding within the uterus the external bleeding had been controlled.  Attention was first turned to the fallopian tubes which were dissected off of the ovaries.  The round ligaments were then transected.  The retroperitoneum was entered and the ureters were identified bilaterally.  With the broad ligament nicely dissected and the ureters visible the utero-ovarian ligament and the attachments were clamped and transected, allowing the ovaries to be retained.  This was performed on both sides.  The bladder was then dissected inferiorly and there was some evidence of placenta near the bladder.  There was a small to moderate amount of bleeding at this time.  Nevertheless, the dissection could be carried without significant event.  The uterine arteries were then transected and suture ligated in the usual fashion.  Using a slow dissection, the bladder was continuously dissected inferiorly and the uterine arteries were continued to be skeletonized.  This was somewhat challenging in light of the distorted anatomy, however, ultimately it was felt that the resection was complete and uneventful.  Due to the length of the procedure, there was a steady amount of bleeding throughout but no points of significant hemorrhage.  There was a period of  hypotension during surgery which was felt to be possibly vasovagal in origin, but this resolved within a few seconds.  At the level of the cervix the uterus was amputated and the defects were closed using interrupted figure-of-eight 2-0 Vicryl suture.      The abdomen was copiously irrigated.  Hemostasis was assured.  The small bleeders were closed using either clips or cautery.  The raw surfaces were covered with a small amount of Surgicel and good hemostasis was assured.  The abdomen was explored.  All the sponges were removed.  Unfortunately, the sponge count was off by a total of 5.  It was felt that this may reflect inaccurate initial count.  For this reason an x-ray was obtained and there did not appear to be any sponges retained.  The fascia was then closed using 0 PDS suture in a looped fashion.  The subcutaneous tissue was closed using 2-0 Vicryl, and the skin was stapled.  The patient was placed back in supine position after a cystoscopy had been performed and there was noted to be good efflux from both ureters and no evidence of defect in the bladder.      The patient tolerated the procedure well and was brought to the recovery room in stable condition.         BRUCE DOBSON MD             D: 2018   T: 2018   MT: ESTEBAN      Name:     ROGELIO LEVIN   MRN:      [id number]        Account:        [id number]   :      1984           Procedure Date: 2018      Document: [id number]     Health Care Proxy (HCP)

## 2019-10-02 NOTE — CONSULT NOTE ADULT - SUBJECTIVE AND OBJECTIVE BOX
CHIEF COMPLAINT:Patient is a 69y old  Female who presents with a chief complaint of abdominal pain (02 Oct 2019 12:14)      HISTORY OF PRESENT ILLNESS:    69 female with history as below DM, HTN, HLD, asthma, GERD p/w abdominal pain and bloody bowel movement She states she has had 3 total episodes of emesis and last episode tonight looked the same as her bloody BM. Pt states she has lower abdominal pain, crampy in nature. Associated she has dizziness/lightheadedness. Reports normal colonoscopy 3-4 years ago. she is planned for EGD / Colonoscopy   denies any chest pain, sob, palpitation, dizziness or syncope.     PAST MEDICAL & SURGICAL HISTORY:  Glaucoma of both eyes, unspecified glaucoma type  Sleep apnea, unspecified type  Calcaneal spur of foot, right  Asthma: 3-4 x/months.  Last attack 2 weeks ago  Gastroesophageal reflux disease, esophagitis presence not specified  Hyperlipidemia, unspecified hyperlipidemia type  Hypertension, unspecified type  DM (diabetes mellitus screen)  History of right cataract extraction  History of left cataract extraction  Shoulder problem: right shoulder repaired  History of bunionectomy of right great toe  H/O abdominal hysterectomy: 1998  H/O ventral hernia repair  S/P cholecystectomy          MEDICATIONS:  furosemide    Tablet 40 milliGRAM(s) Oral daily  metoprolol succinate  milliGRAM(s) Oral daily    piperacillin/tazobactam IVPB.. 3.375 Gram(s) IV Intermittent every 8 hours    ALBUTerol    90 MICROgram(s) HFA Inhaler 2 Puff(s) Inhalation every 6 hours PRN  buDESOnide  80 MICROgram(s)/formoterol 4.5 MICROgram(s) Inhaler 2 Puff(s) Inhalation two times a day  montelukast 10 milliGRAM(s) Oral daily    morphine  - Injectable 2 milliGRAM(s) IV Push Once    pantoprazole  Injectable 40 milliGRAM(s) IV Push two times a day    atorvastatin 20 milliGRAM(s) Oral at bedtime  dextrose 40% Gel 15 Gram(s) Oral once PRN  dextrose 50% Injectable 12.5 Gram(s) IV Push once  dextrose 50% Injectable 25 Gram(s) IV Push once  dextrose 50% Injectable 25 Gram(s) IV Push once  glucagon  Injectable 1 milliGRAM(s) IntraMuscular once PRN  insulin lispro (HumaLOG) corrective regimen sliding scale   SubCutaneous three times a day before meals    dextrose 5%. 1000 milliLiter(s) IV Continuous <Continuous>  latanoprost 0.005% Ophthalmic Solution 1 Drop(s) Both EYES at bedtime  sodium chloride 0.9%. 1000 milliLiter(s) IV Continuous <Continuous>      FAMILY HISTORY:  Family history of premature coronary heart disease      Non-contributory    SOCIAL HISTORY:    No tobacco, drugs or etoh    Allergies    No Known Allergies    Intolerances    	    REVIEW OF SYSTEMS:  as above  The rest of the 14 points ROS reviewed and except above they are unremarkable.        PHYSICAL EXAM:  T(C): 36.7 (10-02-19 @ 05:24), Max: 36.8 (10-02-19 @ 03:49)  HR: 77 (10-02-19 @ 12:14) (75 - 94)  BP: 139/71 (10-02-19 @ 12:14) (109/60 - 143/74)  RR: 17 (10-02-19 @ 12:14) (16 - 18)  SpO2: 99% (10-02-19 @ 12:14) (99% - 100%)  Wt(kg): --  I&O's Summary      Appearance: Normal	  HEENT:   no gross abnormality   Cardiovascular: Normal S1 S2,    Murmur:   Neck: JVP normal  Respiratory: Lungs clear   Gastrointestinal:  Soft, Non-tender  Skin: normal   Neuro: No gross deficits.   Psychiatry:  Mood & affect flat  Ext: No edema    LABS/DATA:    TELEMETRY: 	    ECG:  	   	  CARDIAC MARKERS:                                      13.0   10.20 )-----------( 227      ( 02 Oct 2019 07:10 )             40.0     10-02    145  |  108<H>  |  17  ----------------------------<  155<H>  4.7   |  20<L>  |  1.09    Ca    10.5      02 Oct 2019 00:15    TPro  8.0  /  Alb  4.3  /  TBili  0.4  /  DBili  x   /  AST  24  /  ALT  15  /  AlkPhos  92  10-02    proBNP:   Lipid Profile:   HgA1c:   TSH:

## 2019-10-02 NOTE — H&P ADULT - NSHPPHYSICALEXAM_GEN_ALL_CORE
General: WN/WD NAD  PERRLA  Neurology: A&Ox3, nonfocal, DAWSON x 4  Respiratory: CTA B/L  CV: RRR, S1S2, no murmurs, rubs or gallops  Abdominal: Soft, NT, ND +BS, Last BM  Extremities: No edema, + peripheral pulses  Skin Normal

## 2019-10-02 NOTE — H&P ADULT - NSHPLABSRESULTS_GEN_ALL_CORE
Lab Results:  CBC  CBC Full  -  ( 02 Oct 2019 07:10 )  WBC Count : 10.20 K/uL  RBC Count : 4.57 M/uL  Hemoglobin : 13.0 g/dL  Hematocrit : 40.0 %  Platelet Count - Automated : 227 K/uL  Mean Cell Volume : 87.5 fL  Mean Cell Hemoglobin : 28.4 pg  Mean Cell Hemoglobin Concentration : 32.5 %  Auto Neutrophil # : x  Auto Lymphocyte # : x  Auto Monocyte # : x  Auto Eosinophil # : x  Auto Basophil # : x  Auto Neutrophil % : x  Auto Lymphocyte % : x  Auto Monocyte % : x  Auto Eosinophil % : x  Auto Basophil % : x    .		Differential:	[] Automated		[] Manual  Chemistry                        13.0   10.20 )-----------( 227      ( 02 Oct 2019 07:10 )             40.0     10-02    145  |  108<H>  |  17  ----------------------------<  155<H>  4.7   |  20<L>  |  1.09    Ca    10.5      02 Oct 2019 00:15    TPro  8.0  /  Alb  4.3  /  TBili  0.4  /  DBili  x   /  AST  24  /  ALT  15  /  AlkPhos  92  10-02    LIVER FUNCTIONS - ( 02 Oct 2019 00:15 )  Alb: 4.3 g/dL / Pro: 8.0 g/dL / ALK PHOS: 92 u/L / ALT: 15 u/L / AST: 24 u/L / GGT: x           PT/INR - ( 02 Oct 2019 03:00 )   PT: 11.6 SEC;   INR: 1.04          PTT - ( 02 Oct 2019 03:00 )  PTT:28.6 SEC          MICROBIOLOGY/CULTURES:      RADIOLOGY RESULTS: reviewed

## 2019-10-02 NOTE — H&P ADULT - NSICDXPASTSURGICALHX_GEN_ALL_CORE_FT
PAST SURGICAL HISTORY:  H/O abdominal hysterectomy 1998    H/O ventral hernia repair     History of bunionectomy of right great toe     History of left cataract extraction     History of right cataract extraction     S/P cholecystectomy     Shoulder problem right shoulder repaired

## 2019-10-02 NOTE — CONSULT NOTE ADULT - SUBJECTIVE AND OBJECTIVE BOX
Patient is a 69y old  Female who presents with a chief complaint of abdominal pain (02 Oct 2019 11:55)      HPI:  69yF w/pmhx DM, HTN, HLD, asthma, GERD p/w abdominal pain and bloody bowel movements. Pt states last night she developed nausea, vomiting, diarrhea and lower abdominal pain. Pt states she has had multiple episodes of diarrhea with the most recent two being only blood, she states it is dark red in color without feces mixed in. She states she has had 3 total episodes of emesis and last episode tonight looked the same as her bloody BM. Pt states she has lower abdominal pain, crampy in nature. Associated she has dizziness/lightheadedness. Reports normal colonoscopy 3-4 years ago. Pt denies fever/chills, sick contacts, recent travel, back pain, chest pain, shortness of breath, palpitations, dysuria, urinary frequency or any other concerns. (02 Oct 2019 11:29)    she says shehas asthma and i son dulera as wellas proair: she says she gets wheezing off and on : But currently she has not been wheezing and is feeling alright: She does have JESUS TOO  ?FOLLOWING PRESENT  [ X] Hx of PE/DVT, [X ] Hx COPD, [Y ] Hx of Asthma, [ X] Hx of Hospitalization, [ Y]  Hx of BiPAP/CPAP use, [ Y] Hx of JESUS    Allergies    No Known Allergies    Intolerances        PAST MEDICAL & SURGICAL HISTORY:  Glaucoma of both eyes, unspecified glaucoma type  Sleep apnea, unspecified type  Calcaneal spur of foot, right  Asthma: 3-4 x/months.  Last attack 2 weeks ago  Gastroesophageal reflux disease, esophagitis presence not specified  Hyperlipidemia, unspecified hyperlipidemia type  Hypertension, unspecified type  DM (diabetes mellitus screen)  History of right cataract extraction  History of left cataract extraction  Shoulder problem: right shoulder repaired  History of bunionectomy of right great toe  H/O abdominal hysterectomy: 1998  H/O ventral hernia repair  S/P cholecystectomy      FAMILY HISTORY:  Family history of premature coronary heart disease      Social History: [ X ] TOBACCO                  [ X ] ETOH                                 [X  ] IVDA/DRUGS    REVIEW OF SYSTEMS      General:	X    Skin/Breast:X  	  Ophthalmologic:X  	  ENMT:	X    Respiratory and Thorax:X  	  Cardiovascular:	X    Gastrointestinal:	GI BLEED    Genitourinary:	x    Musculoskeletal:	x    Neurological:	x    Psychiatric:	x    Hematology/Lymphatics:	x    Endocrine:	  x  Allergic/Immunologic:	x    MEDICATIONS  (STANDING):  atorvastatin 20 milliGRAM(s) Oral at bedtime  buDESOnide  80 MICROgram(s)/formoterol 4.5 MICROgram(s) Inhaler 2 Puff(s) Inhalation two times a day  dextrose 5%. 1000 milliLiter(s) (50 mL/Hr) IV Continuous <Continuous>  dextrose 50% Injectable 12.5 Gram(s) IV Push once  dextrose 50% Injectable 25 Gram(s) IV Push once  dextrose 50% Injectable 25 Gram(s) IV Push once  furosemide    Tablet 40 milliGRAM(s) Oral daily  insulin lispro (HumaLOG) corrective regimen sliding scale   SubCutaneous three times a day before meals  latanoprost 0.005% Ophthalmic Solution 1 Drop(s) Both EYES at bedtime  metoprolol succinate  milliGRAM(s) Oral daily  montelukast 10 milliGRAM(s) Oral daily  pantoprazole    Tablet 40 milliGRAM(s) Oral before breakfast  piperacillin/tazobactam IVPB. 3.375 Gram(s) IV Intermittent once  piperacillin/tazobactam IVPB.. 3.375 Gram(s) IV Intermittent every 8 hours  sodium chloride 0.9%. 1000 milliLiter(s) (125 mL/Hr) IV Continuous <Continuous>    MEDICATIONS  (PRN):  ALBUTerol    90 MICROgram(s) HFA Inhaler 2 Puff(s) Inhalation every 6 hours PRN Bronchospasm  dextrose 40% Gel 15 Gram(s) Oral once PRN Blood Glucose LESS THAN 70 milliGRAM(s)/deciliter  glucagon  Injectable 1 milliGRAM(s) IntraMuscular once PRN Glucose LESS THAN 70 milligrams/deciliter       Vital Signs Last 24 Hrs  T(C): 36.7 (02 Oct 2019 05:24), Max: 36.8 (02 Oct 2019 03:49)  T(F): 98.1 (02 Oct 2019 05:24), Max: 98.3 (02 Oct 2019 03:49)  HR: 75 (02 Oct 2019 05:24) (75 - 94)  BP: 109/60 (02 Oct 2019 05:24) (109/60 - 143/74)  BP(mean): --  RR: 16 (02 Oct 2019 05:24) (16 - 18)  SpO2: 99% (02 Oct 2019 05:24) (99% - 100%)        I&O's Summary      Physical Exam:   GENERAL: NAD, well-groomed, well-developed  HEENT: ZACHARY/   Atraumatic, Normocephalic  ENMT: No tonsillar erythema, exudates, or enlargement; Moist mucous membranes, Good dentition, No lesions  NECK: Supple, No JVD, Normal thyroid  CHEST/LUNG: Clear to auscultation bilaterally; No rales, rhonchi, wheezing, or rubs  CVS: Regular rate and rhythm; No murmurs, rubs, or gallops  GI: : Soft, Nontender, Nondistended; Bowel sounds present  NERVOUS SYSTEM:  Alert & Oriented X3  EXTREMITIES:  2+ Peripheral Pulses, No clubbing, cyanosis, or edema  LYMPH: No lymphadenopathy noted  SKIN: No rashes or lesions  ENDOCRINOLOGY: No Thyromegaly  PSYCH: Appropriate    Labs:  1.2<47<4>>41<<7.365>>1.2<<3><<4><<5<<419>>                            13.0   10.20 )-----------( 227      ( 02 Oct 2019 07:10 )             40.0                         14.0   11.73 )-----------( 246      ( 02 Oct 2019 03:00 )             43.7     10-02    145  |  108<H>  |  17  ----------------------------<  155<H>  4.7   |  20<L>  |  1.09    Ca    10.5      02 Oct 2019 00:15    TPro  8.0  /  Alb  4.3  /  TBili  0.4  /  DBili  x   /  AST  24  /  ALT  15  /  AlkPhos  92  10-02    CAPILLARY BLOOD GLUCOSE      POCT Blood Glucose.: 129 mg/dL (02 Oct 2019 08:45)  POCT Blood Glucose.: 211 mg/dL (01 Oct 2019 21:38)    LIVER FUNCTIONS - ( 02 Oct 2019 00:15 )  Alb: 4.3 g/dL / Pro: 8.0 g/dL / ALK PHOS: 92 u/L / ALT: 15 u/L / AST: 24 u/L / GGT: x           PT/INR - ( 02 Oct 2019 03:00 )   PT: 11.6 SEC;   INR: 1.04          PTT - ( 02 Oct 2019 03:00 )  PTT:28.6 SEC    D DImer      Studies  Chest X-RAY  CT SCAN Chest   CT Abdomen  Venous Dopplers: LE:   Others    < from: CT Abdomen and Pelvis w/ IV Cont (10.02.19 @ 03:34) >  tion. Normal appendix.   PERITONEUM: No free air.    VESSELS: Atherosclerotic changes.   RETROPERITONEUM/LYMPH NODES: No lymphadenopathy.    ABDOMINAL WALL: Ventral abdominal wall hernia mesh repair.  BONES: Multilevel thoracic spondylosis.    IMPRESSION:     Long segment thickening of the colon centered on the splenic flexure   extending from the mid transverse colon to the mid descending colon due   to infectious or inflammatory colitis. Due to the watershed location   however, ischemic colitis should be considered.                  NAHID IQBAL M.D., RADIOLOGY RESIDENT    < end of copied text >

## 2019-10-02 NOTE — ED ADULT NURSE NOTE - OBJECTIVE STATEMENT
Pt assessed by Facilitator RN in INTAKE ED 7 from CT, previously eval;d by ED MD - sheri w/ c/o diffuse lower abd pain, cramping, and bloody BMs x 3days.  PMHx of DM, HTN, HLD, asthma, GERD. Pt vitals stable, afebrile, no acute distress noted at this time, aaox4, ambulatory. Pt medicated per orders. Pending CT & lab results.  Medicated per orders.  Will CTM.

## 2019-10-02 NOTE — ED ADULT NURSE NOTE - NSIMPLEMENTINTERV_GEN_ALL_ED
Implemented All Universal Safety Interventions:  [place] to call system. Call bell, personal items and telephone within reach. Instruct patient to call for assistance. Room bathroom lighting operational. Non-slip footwear when patient is off stretcher. Physically safe environment: no spills, clutter or unnecessary equipment. Stretcher in lowest position, wheels locked, appropriate side rails in place.

## 2019-10-02 NOTE — CONSULT NOTE ADULT - PROBLEM SELECTOR RECOMMENDATION 9
- CT abd reviewed with colitis  - pt symptomatic with abd pain and rectal bleeding; hemodynamically stable   - recommend Cipro/Flagyl   - h/h stable and in normal range   - transfuse prn   - check GI PCR and Ova/Parasites  - pain control prn   - plans for Colonoscopy tomorrow   - clear liquid diet today, NPO p MN   - bowel prep with Moviprep 1L Q4H x 2 doses starting 1800 and Dulcolax 10mg Q4H x 2 doses starting at 1600 ordered - CT abd reviewed with colitis  - pt symptomatic with abd pain and rectal bleeding; hemodynamically stable   - recommend starting Cipro/Flagyl x 10 day total course   - h/h stable and in normal range   - transfuse prn   - check GI PCR and Ova/Parasites  - pain control prn   - plans for Colonoscopy tomorrow +/- EGD given episode of hematemesis    - clear liquid diet today  - bowel prep ordered  - NPO p MN - CT abd reviewed with colitis  - pt symptomatic with abd pain, episode hematemesis and rectal bleeding  - hemodynamically stable   - recommend starting Cipro/Flagyl x 10 day total course   - h/h stable and in normal range   - transfuse prn   - check GI PCR and Ova/Parasites; ordered/pending to r/o infectious process   - pain control prn   - to defer colonoscopy at this time as d/w my attending   - NPO p MN for EGD given episode of hematemesis    - clear liquid diet today  - NPO p MN

## 2019-10-03 ENCOUNTER — RESULT REVIEW (OUTPATIENT)
Age: 69
End: 2019-10-03

## 2019-10-03 LAB
ALBUMIN SERPL ELPH-MCNC: 3.8 G/DL — SIGNIFICANT CHANGE UP (ref 3.3–5)
ALP SERPL-CCNC: 78 U/L — SIGNIFICANT CHANGE UP (ref 40–120)
ALT FLD-CCNC: 11 U/L — SIGNIFICANT CHANGE UP (ref 4–33)
ANION GAP SERPL CALC-SCNC: 11 MMO/L — SIGNIFICANT CHANGE UP (ref 7–14)
AST SERPL-CCNC: 14 U/L — SIGNIFICANT CHANGE UP (ref 4–32)
BILIRUB SERPL-MCNC: 0.5 MG/DL — SIGNIFICANT CHANGE UP (ref 0.2–1.2)
BUN SERPL-MCNC: 15 MG/DL — SIGNIFICANT CHANGE UP (ref 7–23)
CALCIUM SERPL-MCNC: 9.4 MG/DL — SIGNIFICANT CHANGE UP (ref 8.4–10.5)
CHLORIDE SERPL-SCNC: 112 MMOL/L — HIGH (ref 98–107)
CO2 SERPL-SCNC: 22 MMOL/L — SIGNIFICANT CHANGE UP (ref 22–31)
CREAT SERPL-MCNC: 1.23 MG/DL — SIGNIFICANT CHANGE UP (ref 0.5–1.3)
GLUCOSE BLDC GLUCOMTR-MCNC: 100 MG/DL — HIGH (ref 70–99)
GLUCOSE BLDC GLUCOMTR-MCNC: 103 MG/DL — HIGH (ref 70–99)
GLUCOSE BLDC GLUCOMTR-MCNC: 118 MG/DL — HIGH (ref 70–99)
GLUCOSE BLDC GLUCOMTR-MCNC: 222 MG/DL — HIGH (ref 70–99)
GLUCOSE BLDC GLUCOMTR-MCNC: 81 MG/DL — SIGNIFICANT CHANGE UP (ref 70–99)
GLUCOSE SERPL-MCNC: 100 MG/DL — HIGH (ref 70–99)
HBA1C BLD-MCNC: 7.6 % — HIGH (ref 4–5.6)
HCT VFR BLD CALC: 38.4 % — SIGNIFICANT CHANGE UP (ref 34.5–45)
HGB BLD-MCNC: 12.6 G/DL — SIGNIFICANT CHANGE UP (ref 11.5–15.5)
MCHC RBC-ENTMCNC: 28.8 PG — SIGNIFICANT CHANGE UP (ref 27–34)
MCHC RBC-ENTMCNC: 32.8 % — SIGNIFICANT CHANGE UP (ref 32–36)
MCV RBC AUTO: 87.9 FL — SIGNIFICANT CHANGE UP (ref 80–100)
NRBC # FLD: 0 K/UL — SIGNIFICANT CHANGE UP (ref 0–0)
PLATELET # BLD AUTO: 205 K/UL — SIGNIFICANT CHANGE UP (ref 150–400)
PMV BLD: 11.4 FL — SIGNIFICANT CHANGE UP (ref 7–13)
POTASSIUM SERPL-MCNC: 4.1 MMOL/L — SIGNIFICANT CHANGE UP (ref 3.5–5.3)
POTASSIUM SERPL-SCNC: 4.1 MMOL/L — SIGNIFICANT CHANGE UP (ref 3.5–5.3)
PROT SERPL-MCNC: 6.9 G/DL — SIGNIFICANT CHANGE UP (ref 6–8.3)
RBC # BLD: 4.37 M/UL — SIGNIFICANT CHANGE UP (ref 3.8–5.2)
RBC # FLD: 15.2 % — HIGH (ref 10.3–14.5)
SODIUM SERPL-SCNC: 145 MMOL/L — SIGNIFICANT CHANGE UP (ref 135–145)
WBC # BLD: 8.82 K/UL — SIGNIFICANT CHANGE UP (ref 3.8–10.5)
WBC # FLD AUTO: 8.82 K/UL — SIGNIFICANT CHANGE UP (ref 3.8–10.5)

## 2019-10-03 PROCEDURE — 88312 SPECIAL STAINS GROUP 1: CPT | Mod: 26

## 2019-10-03 PROCEDURE — 88305 TISSUE EXAM BY PATHOLOGIST: CPT | Mod: 26

## 2019-10-03 RX ORDER — SODIUM CHLORIDE 9 MG/ML
1000 INJECTION INTRAMUSCULAR; INTRAVENOUS; SUBCUTANEOUS
Refills: 0 | Status: DISCONTINUED | OUTPATIENT
Start: 2019-10-03 | End: 2019-10-04

## 2019-10-03 RX ADMIN — BUDESONIDE AND FORMOTEROL FUMARATE DIHYDRATE 2 PUFF(S): 160; 4.5 AEROSOL RESPIRATORY (INHALATION) at 09:36

## 2019-10-03 RX ADMIN — ATORVASTATIN CALCIUM 20 MILLIGRAM(S): 80 TABLET, FILM COATED ORAL at 22:06

## 2019-10-03 RX ADMIN — Medication 200 MILLIGRAM(S): at 18:43

## 2019-10-03 RX ADMIN — PANTOPRAZOLE SODIUM 40 MILLIGRAM(S): 20 TABLET, DELAYED RELEASE ORAL at 18:43

## 2019-10-03 RX ADMIN — LATANOPROST 1 DROP(S): 0.05 SOLUTION/ DROPS OPHTHALMIC; TOPICAL at 22:06

## 2019-10-03 RX ADMIN — SODIUM CHLORIDE 75 MILLILITER(S): 9 INJECTION INTRAMUSCULAR; INTRAVENOUS; SUBCUTANEOUS at 22:43

## 2019-10-03 RX ADMIN — Medication 100 MILLIGRAM(S): at 06:57

## 2019-10-03 RX ADMIN — Medication 200 MILLIGRAM(S): at 05:22

## 2019-10-03 RX ADMIN — Medication 40 MILLIGRAM(S): at 06:58

## 2019-10-03 RX ADMIN — BUDESONIDE AND FORMOTEROL FUMARATE DIHYDRATE 2 PUFF(S): 160; 4.5 AEROSOL RESPIRATORY (INHALATION) at 22:06

## 2019-10-03 RX ADMIN — Medication 100 MILLIGRAM(S): at 05:21

## 2019-10-03 RX ADMIN — Medication 100 MILLIGRAM(S): at 22:42

## 2019-10-03 RX ADMIN — Medication 100 MILLIGRAM(S): at 13:09

## 2019-10-03 RX ADMIN — Medication 4 UNIT(S): at 18:41

## 2019-10-03 RX ADMIN — PANTOPRAZOLE SODIUM 40 MILLIGRAM(S): 20 TABLET, DELAYED RELEASE ORAL at 05:22

## 2019-10-03 RX ADMIN — INSULIN GLARGINE 6 UNIT(S): 100 INJECTION, SOLUTION SUBCUTANEOUS at 22:06

## 2019-10-03 RX ADMIN — MONTELUKAST 10 MILLIGRAM(S): 4 TABLET, CHEWABLE ORAL at 18:42

## 2019-10-03 NOTE — PROGRESS NOTE ADULT - SUBJECTIVE AND OBJECTIVE BOX
Patient is a 69y old  Female who presents with a chief complaint of abdominal pain (03 Oct 2019 14:16)      INTERVAL HPI/OVERNIGHT EVENTS:  T(C): 36.7 (10-03-19 @ 16:48), Max: 36.7 (10-03-19 @ 05:12)  HR: 83 (10-03-19 @ 16:48) (68 - 83)  BP: 144/89 (10-03-19 @ 16:48) (115/60 - 145/80)  RR: 17 (10-03-19 @ 16:48) (16 - 18)  SpO2: 98% (10-03-19 @ 16:48) (94% - 100%)  Wt(kg): --  I&O's Summary    03 Oct 2019 07:01  -  03 Oct 2019 18:30  --------------------------------------------------------  IN: 0 mL / OUT: 300 mL / NET: -300 mL        LABS:                        12.6   8.82  )-----------( 205      ( 03 Oct 2019 05:30 )             38.4     10-03    145  |  112<H>  |  15  ----------------------------<  100<H>  4.1   |  22  |  1.23    Ca    9.4      03 Oct 2019 05:30    TPro  6.9  /  Alb  3.8  /  TBili  0.5  /  DBili  x   /  AST  14  /  ALT  11  /  AlkPhos  78  10-03    PT/INR - ( 02 Oct 2019 03:00 )   PT: 11.6 SEC;   INR: 1.04          PTT - ( 02 Oct 2019 03:00 )  PTT:28.6 SEC    CAPILLARY BLOOD GLUCOSE      POCT Blood Glucose.: 81 mg/dL (03 Oct 2019 17:12)  POCT Blood Glucose.: 100 mg/dL (03 Oct 2019 12:30)  POCT Blood Glucose.: 118 mg/dL (03 Oct 2019 08:10)  POCT Blood Glucose.: 117 mg/dL (02 Oct 2019 22:33)            MEDICATIONS  (STANDING):  atorvastatin 20 milliGRAM(s) Oral at bedtime  buDESOnide  80 MICROgram(s)/formoterol 4.5 MICROgram(s) Inhaler 2 Puff(s) Inhalation two times a day  ciprofloxacin   IVPB 400 milliGRAM(s) IV Intermittent every 12 hours  dextrose 5%. 1000 milliLiter(s) (50 mL/Hr) IV Continuous <Continuous>  dextrose 50% Injectable 12.5 Gram(s) IV Push once  dextrose 50% Injectable 25 Gram(s) IV Push once  dextrose 50% Injectable 25 Gram(s) IV Push once  furosemide    Tablet 40 milliGRAM(s) Oral daily  influenza   Vaccine 0.5 milliLiter(s) IntraMuscular once  insulin glargine Injectable (LANTUS) 6 Unit(s) SubCutaneous at bedtime  insulin lispro (HumaLOG) corrective regimen sliding scale   SubCutaneous three times a day before meals  insulin lispro Injectable (HumaLOG) 4 Unit(s) SubCutaneous three times a day before meals  latanoprost 0.005% Ophthalmic Solution 1 Drop(s) Both EYES at bedtime  metoprolol succinate  milliGRAM(s) Oral daily  metroNIDAZOLE  IVPB      metroNIDAZOLE  IVPB 500 milliGRAM(s) IV Intermittent every 8 hours  montelukast 10 milliGRAM(s) Oral daily  morphine  - Injectable 2 milliGRAM(s) IV Push Once  pantoprazole  Injectable 40 milliGRAM(s) IV Push two times a day  sodium chloride 0.9%. 1000 milliLiter(s) (125 mL/Hr) IV Continuous <Continuous>    MEDICATIONS  (PRN):  ALBUTerol    90 MICROgram(s) HFA Inhaler 2 Puff(s) Inhalation every 6 hours PRN Bronchospasm  dextrose 40% Gel 15 Gram(s) Oral once PRN Blood Glucose LESS THAN 70 milliGRAM(s)/deciliter  glucagon  Injectable 1 milliGRAM(s) IntraMuscular once PRN Glucose LESS THAN 70 milligrams/deciliter          PHYSICAL EXAM:  GENERAL: NAD, well-groomed, well-developed  HEAD:  Atraumatic, Normocephalic  CHEST/LUNG: Clear to percussion bilaterally; No rales, rhonchi, wheezing, or rubs  HEART: Regular rate and rhythm; No murmurs, rubs, or gallops  ABDOMEN: Soft, Nontender, Nondistended; Bowel sounds present  EXTREMITIES:  2+ Peripheral Pulses, No clubbing, cyanosis, or edema  LYMPH: No lymphadenopathy noted  SKIN: No rashes or lesions    Care Discussed with Consultants/Other Providers [ ] YES  [ ] NO

## 2019-10-03 NOTE — PROGRESS NOTE ADULT - SUBJECTIVE AND OBJECTIVE BOX
Chief complaint  Patient is a 69y old  Female who presents with a chief complaint of abdominal pain (02 Oct 2019 17:19)   Review of systems  Patient in bed, looks comfortable, no fever, no hypoglycemia.    Labs and Fingersticks  CAPILLARY BLOOD GLUCOSE      POCT Blood Glucose.: 118 mg/dL (03 Oct 2019 08:10)  POCT Blood Glucose.: 117 mg/dL (02 Oct 2019 22:33)  POCT Blood Glucose.: 168 mg/dL (02 Oct 2019 18:29)  POCT Blood Glucose.: 115 mg/dL (02 Oct 2019 13:22)  POCT Blood Glucose.: 129 mg/dL (02 Oct 2019 08:45)      Anion Gap, Serum: 11 (10-03 @ 05:30)  Anion Gap, Serum: 17 <H> (10-02 @ 00:15)    Hemoglobin A1C, Whole Blood: 7.6 <H> (10-03 @ 05:30)    Calcium, Total Serum: 9.4 (10-03 @ 05:30)  Calcium, Total Serum: 10.5 (10-02 @ 00:15)  Albumin, Serum: 3.8 (10-03 @ 05:30)  Albumin, Serum: 4.3 (10-02 @ 00:15)    Alanine Aminotransferase (ALT/SGPT): 11 (10-03 @ 05:30)  Alanine Aminotransferase (ALT/SGPT): 15 (10-02 @ 00:15)  Alkaline Phosphatase, Serum: 78 (10-03 @ 05:30)  Alkaline Phosphatase, Serum: 92 (10-02 @ 00:15)  Aspartate Aminotransferase (AST/SGOT): 14 (10-03 @ 05:30)  Aspartate Aminotransferase (AST/SGOT): 24 (10-02 @ 00:15)        10-03    145  |  112<H>  |  15  ----------------------------<  100<H>  4.1   |  22  |  1.23    Ca    9.4      03 Oct 2019 05:30    TPro  6.9  /  Alb  3.8  /  TBili  0.5  /  DBili  x   /  AST  14  /  ALT  11  /  AlkPhos  78  10-03                        12.6   8.82  )-----------( 205      ( 03 Oct 2019 05:30 )             38.4     Medications  MEDICATIONS  (STANDING):  atorvastatin 20 milliGRAM(s) Oral at bedtime  buDESOnide  80 MICROgram(s)/formoterol 4.5 MICROgram(s) Inhaler 2 Puff(s) Inhalation two times a day  ciprofloxacin   IVPB 400 milliGRAM(s) IV Intermittent every 12 hours  dextrose 5%. 1000 milliLiter(s) (50 mL/Hr) IV Continuous <Continuous>  dextrose 50% Injectable 12.5 Gram(s) IV Push once  dextrose 50% Injectable 25 Gram(s) IV Push once  dextrose 50% Injectable 25 Gram(s) IV Push once  furosemide    Tablet 40 milliGRAM(s) Oral daily  influenza   Vaccine 0.5 milliLiter(s) IntraMuscular once  insulin glargine Injectable (LANTUS) 6 Unit(s) SubCutaneous at bedtime  insulin lispro (HumaLOG) corrective regimen sliding scale   SubCutaneous three times a day before meals  insulin lispro Injectable (HumaLOG) 4 Unit(s) SubCutaneous three times a day before meals  latanoprost 0.005% Ophthalmic Solution 1 Drop(s) Both EYES at bedtime  metoprolol succinate  milliGRAM(s) Oral daily  metroNIDAZOLE  IVPB      metroNIDAZOLE  IVPB 500 milliGRAM(s) IV Intermittent every 8 hours  montelukast 10 milliGRAM(s) Oral daily  morphine  - Injectable 2 milliGRAM(s) IV Push Once  pantoprazole  Injectable 40 milliGRAM(s) IV Push two times a day  sodium chloride 0.9%. 1000 milliLiter(s) (125 mL/Hr) IV Continuous <Continuous>      Physical Exam  General: Patient comfortable in bed  Vital Signs Last 12 Hrs  T(F): 98 (10-03-19 @ 05:12), Max: 98 (10-03-19 @ 05:12)  HR: 77 (10-03-19 @ 05:12) (68 - 79)  BP: 128/74 (10-03-19 @ 05:12) (120/63 - 134/74)  BP(mean): --  RR: 18 (10-03-19 @ 05:12) (16 - 18)  SpO2: 100% (10-03-19 @ 05:12) (94% - 100%)  Neck: No palpable thyroid nodules.  CVS: S1S2, No murmurs  Respiratory: No wheezing, no crepitations  GI: Abdomen soft, bowel sounds positive  Musculoskeletal:  edema lower extremities.   Skin: No skin rashes, no ecchymosis    Diagnostics    Hemoglobin A1C, Whole Blood: Routine (10-02 @ 17:19)

## 2019-10-03 NOTE — PROGRESS NOTE ADULT - SUBJECTIVE AND OBJECTIVE BOX
Patient is a 69y old  Female who presents with a chief complaint of abdominal pain (03 Oct 2019 09:28)      Any change in ROS: Still bleeding per pateint:   she isnot SOB : no wheezing     MEDICATIONS  (STANDING):  atorvastatin 20 milliGRAM(s) Oral at bedtime  buDESOnide  80 MICROgram(s)/formoterol 4.5 MICROgram(s) Inhaler 2 Puff(s) Inhalation two times a day  ciprofloxacin   IVPB 400 milliGRAM(s) IV Intermittent every 12 hours  dextrose 5%. 1000 milliLiter(s) (50 mL/Hr) IV Continuous <Continuous>  dextrose 50% Injectable 12.5 Gram(s) IV Push once  dextrose 50% Injectable 25 Gram(s) IV Push once  dextrose 50% Injectable 25 Gram(s) IV Push once  furosemide    Tablet 40 milliGRAM(s) Oral daily  influenza   Vaccine 0.5 milliLiter(s) IntraMuscular once  insulin glargine Injectable (LANTUS) 6 Unit(s) SubCutaneous at bedtime  insulin lispro (HumaLOG) corrective regimen sliding scale   SubCutaneous three times a day before meals  insulin lispro Injectable (HumaLOG) 4 Unit(s) SubCutaneous three times a day before meals  latanoprost 0.005% Ophthalmic Solution 1 Drop(s) Both EYES at bedtime  metoprolol succinate  milliGRAM(s) Oral daily  metroNIDAZOLE  IVPB      metroNIDAZOLE  IVPB 500 milliGRAM(s) IV Intermittent every 8 hours  montelukast 10 milliGRAM(s) Oral daily  morphine  - Injectable 2 milliGRAM(s) IV Push Once  pantoprazole  Injectable 40 milliGRAM(s) IV Push two times a day  sodium chloride 0.9%. 1000 milliLiter(s) (125 mL/Hr) IV Continuous <Continuous>    MEDICATIONS  (PRN):  ALBUTerol    90 MICROgram(s) HFA Inhaler 2 Puff(s) Inhalation every 6 hours PRN Bronchospasm  dextrose 40% Gel 15 Gram(s) Oral once PRN Blood Glucose LESS THAN 70 milliGRAM(s)/deciliter  glucagon  Injectable 1 milliGRAM(s) IntraMuscular once PRN Glucose LESS THAN 70 milligrams/deciliter    Vital Signs Last 24 Hrs  T(C): 36.7 (03 Oct 2019 10:53), Max: 36.7 (03 Oct 2019 05:12)  T(F): 98 (03 Oct 2019 10:53), Max: 98 (03 Oct 2019 05:12)  HR: 78 (03 Oct 2019 10:53) (68 - 79)  BP: 115/60 (03 Oct 2019 10:53) (115/60 - 145/80)  BP(mean): --  RR: 18 (03 Oct 2019 10:53) (16 - 18)  SpO2: 100% (03 Oct 2019 10:53) (94% - 100%)    I&O's Summary        Physical Exam:   GENERAL: NAD, well-groomed, well-developed  HEENT: ZACHARY/   Atraumatic, Normocephalic  ENMT: No tonsillar erythema, exudates, or enlargement; Moist mucous membranes, Good dentition, No lesions  NECK: Supple, No JVD, Normal thyroid  CHEST/LUNG: Clear to auscultaion, ; No rales, rhonchi, wheezing, or rubs  CVS: Regular rate and rhythm; No murmurs, rubs, or gallops  GI: : Soft, Nontender, Nondistended; Bowel sounds present  NERVOUS SYSTEM:  Alert & Oriented X3  EXTREMITIES:  2+ Peripheral Pulses, No clubbing, cyanosis, or edema  LYMPH: No lymphadenopathy noted  SKIN: No rashes or lesions  ENDOCRINOLOGY: No Thyromegaly  PSYCH: Appropriate    Labs:  24                            12.6   8.82  )-----------( 205      ( 03 Oct 2019 05:30 )             38.4                         13.0   10.20 )-----------( 227      ( 02 Oct 2019 07:10 )             40.0                         14.0   11.73 )-----------( 246      ( 02 Oct 2019 03:00 )             43.7     10-03    145  |  112<H>  |  15  ----------------------------<  100<H>  4.1   |  22  |  1.23  10-02    145  |  108<H>  |  17  ----------------------------<  155<H>  4.7   |  20<L>  |  1.09    Ca    9.4      03 Oct 2019 05:30  Ca    10.5      02 Oct 2019 00:15    TPro  6.9  /  Alb  3.8  /  TBili  0.5  /  DBili  x   /  AST  14  /  ALT  11  /  AlkPhos  78  10-03  TPro  8.0  /  Alb  4.3  /  TBili  0.4  /  DBili  x   /  AST  24  /  ALT  15  /  AlkPhos  92  10-02    CAPILLARY BLOOD GLUCOSE      POCT Blood Glucose.: 118 mg/dL (03 Oct 2019 08:10)  POCT Blood Glucose.: 117 mg/dL (02 Oct 2019 22:33)  POCT Blood Glucose.: 168 mg/dL (02 Oct 2019 18:29)  POCT Blood Glucose.: 115 mg/dL (02 Oct 2019 13:22)      LIVER FUNCTIONS - ( 03 Oct 2019 05:30 )  Alb: 3.8 g/dL / Pro: 6.9 g/dL / ALK PHOS: 78 u/L / ALT: 11 u/L / AST: 14 u/L / GGT: x           PT/INR - ( 02 Oct 2019 03:00 )   PT: 11.6 SEC;   INR: 1.04          PTT - ( 02 Oct 2019 03:00 )  PTT:28.6 SEC          RECENT CULTURES:    < from: CT Abdomen and Pelvis w/ IV Cont (10.02.19 @ 03:34) >  ANCREAS: Within normal limits.  ADRENALS: Within normal limits.    KIDNEYS/URETERS: Within normal limits.  BLADDER: Within normal limits.  REPRODUCTIVE ORGANS: Hysterectomy.    BOWEL: Long segment thickening of the colon extending from the mid   transverse to the mid descending colon with adjacent fatty   stranding.Colonic diverticulosis without diverticulitis.No bowel   obstruction. Normal appendix.   PERITONEUM: No free air.    VESSELS: Atherosclerotic changes.   RETROPERITONEUM/LYMPH NODES: No lymphadenopathy.    ABDOMINAL WALL: Ventral abdominal wall hernia mesh repair.  BONES: Multilevel thoracic spondylosis.    IMPRESSION:     Long segment thickening of the colon centered on the splenic flexure   extending from the mid transverse colon to the mid descending colon due   to infectious or inflammatory colitis. Due to the watershed location   however, ischemic colitis should be considered.                  NAHID IQBAL M.D., RADIOLOGY RESIDENT    < end of copied text >      RESPIRATORY CULTURES:          Studies  Chest X-RAY  CT SCAN Chest   Venous Dopplers: LE:   CT Abdomen  Others

## 2019-10-03 NOTE — CONSULT NOTE ADULT - SUBJECTIVE AND OBJECTIVE BOX
Patient is a 69y old  Female who presents with a chief complaint of abdominal pain (03 Oct 2019 11:49)      HPI:    69yF w/pmhx DM, HTN, HLD, asthma, GERD p/w abdominal pain and bloody bowel movements. Pt states last night she developed nausea, vomiting, diarrhea and lower abdominal pain. Pt states she has had multiple episodes of diarrhea with the most recent two being only blood, she states it is dark red in color without feces mixed in. She states she has had 3 total episodes of emesis and last episode tonight looked the same as her bloody BM. Pt states she has lower abdominal pain, crampy in nature. Associated she has dizziness/lightheadedness. Reports normal colonoscopy 3-4 years ago. Pt denies fever/chills, sick contacts, recent travel, back pain, chest pain, shortness of breath, palpitations, dysuria, urinary frequency or any other concerns. (02 Oct 2019 11:29)    WBC 11.7 --> 8.8.  Afebrile, normotensive.  CTap shows long segment thickening of the colon  centered on the splenic flexure extending from the mid transverse colon to the mid descending colon due to infectious or inflammatory colitis.  Pt initially given zosyn, now on cipro/flagyl.    ID consult called for further abx recommendations      REVIEW OF SYSTEMS:    CONSTITUTIONAL: No fever, weight loss, or fatigue  EYES: No eye pain, visual disturbances, or discharge  ENMT:  No sore throat  NECK: No pain or stiffness  RESPIRATORY: No cough, wheezing, chills or hemoptysis; No shortness of breath  CARDIOVASCULAR: No chest pain, palpitations, dizziness, or leg swelling  GASTROINTESTINAL: No abdominal or epigastric pain. No nausea, vomiting, or hematemesis; No diarrhea or constipation. No melena or hematochezia.  GENITOURINARY: No dysuria, frequency, hematuria, or incontinence  NEUROLOGICAL: No headaches, memory loss, loss of strength, numbness, or tremors  SKIN: No itching, burning, rashes, or lesions   LYMPH NODES: No enlarged glands  MUSCULOSKELETAL: No joint pain or swelling; No muscle, back, or extremity pain      PAST MEDICAL & SURGICAL HISTORY:  Glaucoma of both eyes, unspecified glaucoma type  Sleep apnea, unspecified type  Calcaneal spur of foot, right  Asthma: 3-4 x/months.  Last attack 2 weeks ago  Gastroesophageal reflux disease, esophagitis presence not specified  Hyperlipidemia, unspecified hyperlipidemia type  Hypertension, unspecified type  DM (diabetes mellitus screen)  History of right cataract extraction  History of left cataract extraction  Shoulder problem: right shoulder repaired  History of bunionectomy of right great toe  H/O abdominal hysterectomy: 1998  H/O ventral hernia repair  S/P cholecystectomy      Allergies    No Known Allergies    Intolerances        FAMILY HISTORY:  Family history of premature coronary heart disease      SOCIAL HISTORY:        MEDICATIONS  (STANDING):  atorvastatin 20 milliGRAM(s) Oral at bedtime  buDESOnide  80 MICROgram(s)/formoterol 4.5 MICROgram(s) Inhaler 2 Puff(s) Inhalation two times a day  ciprofloxacin   IVPB 400 milliGRAM(s) IV Intermittent every 12 hours  dextrose 5%. 1000 milliLiter(s) (50 mL/Hr) IV Continuous <Continuous>  dextrose 50% Injectable 12.5 Gram(s) IV Push once  dextrose 50% Injectable 25 Gram(s) IV Push once  dextrose 50% Injectable 25 Gram(s) IV Push once  furosemide    Tablet 40 milliGRAM(s) Oral daily  influenza   Vaccine 0.5 milliLiter(s) IntraMuscular once  insulin glargine Injectable (LANTUS) 6 Unit(s) SubCutaneous at bedtime  insulin lispro (HumaLOG) corrective regimen sliding scale   SubCutaneous three times a day before meals  insulin lispro Injectable (HumaLOG) 4 Unit(s) SubCutaneous three times a day before meals  latanoprost 0.005% Ophthalmic Solution 1 Drop(s) Both EYES at bedtime  metoprolol succinate  milliGRAM(s) Oral daily  metroNIDAZOLE  IVPB      metroNIDAZOLE  IVPB 500 milliGRAM(s) IV Intermittent every 8 hours  montelukast 10 milliGRAM(s) Oral daily  morphine  - Injectable 2 milliGRAM(s) IV Push Once  pantoprazole  Injectable 40 milliGRAM(s) IV Push two times a day  sodium chloride 0.9%. 1000 milliLiter(s) (125 mL/Hr) IV Continuous <Continuous>    MEDICATIONS  (PRN):  ALBUTerol    90 MICROgram(s) HFA Inhaler 2 Puff(s) Inhalation every 6 hours PRN Bronchospasm  dextrose 40% Gel 15 Gram(s) Oral once PRN Blood Glucose LESS THAN 70 milliGRAM(s)/deciliter  glucagon  Injectable 1 milliGRAM(s) IntraMuscular once PRN Glucose LESS THAN 70 milligrams/deciliter      Vital Signs Last 24 Hrs  T(C): 36.7 (03 Oct 2019 10:53), Max: 36.7 (03 Oct 2019 05:12)  T(F): 98 (03 Oct 2019 10:53), Max: 98 (03 Oct 2019 05:12)  HR: 76 (03 Oct 2019 12:44) (68 - 79)  BP: 115/60 (03 Oct 2019 10:53) (115/60 - 145/80)  BP(mean): --  RR: 18 (03 Oct 2019 10:53) (16 - 18)  SpO2: 97% (03 Oct 2019 12:44) (94% - 100%)    PHYSICAL EXAM:    GENERAL: NAD, well-groomed  HEAD:  Atraumatic, Normocephalic  EYES: EOMI, PERRLA, conjunctiva and sclera clear  ENMT: No tonsillar erythema, exudates, or enlargement; Moist mucous membranes  NECK: Supple, No JVD  CHEST/LUNG: Clear to percussion bilaterally; No rales, rhonchi, wheezing, or rubs  HEART: Regular rate and rhythm; No murmurs, rubs, or gallops  ABDOMEN: Soft, Nontender, Nondistended; Bowel sounds present  EXTREMITIES:  2+ Peripheral Pulses, No clubbing, cyanosis, or edema  LYMPH: No lymphadenopathy noted  SKIN: No rashes or lesions    LABS:  CBC Full  -  ( 03 Oct 2019 05:30 )  WBC Count : 8.82 K/uL  RBC Count : 4.37 M/uL  Hemoglobin : 12.6 g/dL  Hematocrit : 38.4 %  Platelet Count - Automated : 205 K/uL  Mean Cell Volume : 87.9 fL  Mean Cell Hemoglobin : 28.8 pg  Mean Cell Hemoglobin Concentration : 32.8 %  Auto Neutrophil # : x  Auto Lymphocyte # : x  Auto Monocyte # : x  Auto Eosinophil # : x  Auto Basophil # : x  Auto Neutrophil % : x  Auto Lymphocyte % : x  Auto Monocyte % : x  Auto Eosinophil % : x  Auto Basophil % : x      10-03    145  |  112<H>  |  15  ----------------------------<  100<H>  4.1   |  22  |  1.23    Ca    9.4      03 Oct 2019 05:30    TPro  6.9  /  Alb  3.8  /  TBili  0.5  /  DBili  x   /  AST  14  /  ALT  11  /  AlkPhos  78  10-03      LIVER FUNCTIONS - ( 03 Oct 2019 05:30 )  Alb: 3.8 g/dL / Pro: 6.9 g/dL / ALK PHOS: 78 u/L / ALT: 11 u/L / AST: 14 u/L / GGT: x                               MICROBIOLOGY:            RADIOLOGY:      < from: CT Abdomen and Pelvis w/ IV Cont (10.02.19 @ 03:34) >  FINDINGS:    LOWER CHEST: Within normal limits.  LIVER: Within normal limits.  BILE DUCTS: Normal caliber.  GALLBLADDER: Cholecystectomy.    SPLEEN: Within normal limits.  PANCREAS: Within normal limits.  ADRENALS: Within normal limits.    KIDNEYS/URETERS: Within normal limits.  BLADDER: Within normal limits.  REPRODUCTIVE ORGANS: Hysterectomy.    BOWEL: Long segment thickening of the colon extending from the mid   transverse to the mid descending colon with adjacent fatty   stranding.Colonic diverticulosis without diverticulitis.No bowel   obstruction. Normal appendix.   PERITONEUM: No free air.    VESSELS: Atherosclerotic changes.   RETROPERITONEUM/LYMPH NODES: No lymphadenopathy.    ABDOMINAL WALL: Ventral abdominal wall hernia mesh repair.  BONES: Multilevel thoracic spondylosis.    IMPRESSION:     Long segment thickening of the colon centered on the splenic flexure   extending from the mid transverse colon to the mid descending colon due   to infectious or inflammatory colitis. Due to the watershed location   however, ischemic colitis should be considered.    < end of copied text > Patient is a 69y old  Female who presents with a chief complaint of abdominal pain (03 Oct 2019 11:49)      HPI:    69yF w/pmhx DM, HTN, HLD, asthma, GERD p/w abdominal pain and bloody bowel movements. Pt states last night she developed nausea, vomiting, diarrhea and lower abdominal pain. Pt states she has had multiple episodes of diarrhea with the most recent two being only blood, she states it is dark red in color without feces mixed in. She states she has had 3 total episodes of emesis and last episode tonight looked the same as her bloody BM. Pt states she has lower abdominal pain, crampy in nature. Associated she has dizziness/lightheadedness. Reports normal colonoscopy 3-4 years ago. Pt denies fever/chills, sick contacts, recent travel, back pain, chest pain, shortness of breath, palpitations, dysuria, urinary frequency or any other concerns. (02 Oct 2019 11:29)    WBC 11.7 --> 8.8.  Afebrile, normotensive.  CTap shows long segment thickening of the colon  centered on the splenic flexure extending from the mid transverse colon to the mid descending colon due to infectious or inflammatory colitis.  Pt initially given zosyn, now on cipro/flagyl.   Pt for endoscopy.   ID consult called for further abx recommendations      REVIEW OF SYSTEMS:    CONSTITUTIONAL: No fever, weight loss, or fatigue  EYES: No eye pain, visual disturbances, or discharge  ENMT:  No sore throat  NECK: No pain or stiffness  RESPIRATORY: No cough, wheezing, chills or hemoptysis; No shortness of breath  CARDIOVASCULAR: No chest pain, palpitations, dizziness, or leg swelling  GASTROINTESTINAL: No abdominal or epigastric pain. No nausea, vomiting, or hematemesis; No diarrhea or constipation. No melena or hematochezia.  GENITOURINARY: No dysuria, frequency, hematuria, or incontinence  NEUROLOGICAL: No headaches, memory loss, loss of strength, numbness, or tremors  SKIN: No itching, burning, rashes, or lesions   LYMPH NODES: No enlarged glands  MUSCULOSKELETAL: No joint pain or swelling; No muscle, back, or extremity pain      PAST MEDICAL & SURGICAL HISTORY:  Glaucoma of both eyes, unspecified glaucoma type  Sleep apnea, unspecified type  Calcaneal spur of foot, right  Asthma: 3-4 x/months.  Last attack 2 weeks ago  Gastroesophageal reflux disease, esophagitis presence not specified  Hyperlipidemia, unspecified hyperlipidemia type  Hypertension, unspecified type  DM (diabetes mellitus screen)  History of right cataract extraction  History of left cataract extraction  Shoulder problem: right shoulder repaired  History of bunionectomy of right great toe  H/O abdominal hysterectomy: 1998  H/O ventral hernia repair  S/P cholecystectomy      Allergies    No Known Allergies    Intolerances        FAMILY HISTORY:  Family history of premature coronary heart disease      SOCIAL HISTORY:  No smoking, ivdu, etoh      MEDICATIONS  (STANDING):  atorvastatin 20 milliGRAM(s) Oral at bedtime  buDESOnide  80 MICROgram(s)/formoterol 4.5 MICROgram(s) Inhaler 2 Puff(s) Inhalation two times a day  ciprofloxacin   IVPB 400 milliGRAM(s) IV Intermittent every 12 hours  dextrose 5%. 1000 milliLiter(s) (50 mL/Hr) IV Continuous <Continuous>  dextrose 50% Injectable 12.5 Gram(s) IV Push once  dextrose 50% Injectable 25 Gram(s) IV Push once  dextrose 50% Injectable 25 Gram(s) IV Push once  furosemide    Tablet 40 milliGRAM(s) Oral daily  influenza   Vaccine 0.5 milliLiter(s) IntraMuscular once  insulin glargine Injectable (LANTUS) 6 Unit(s) SubCutaneous at bedtime  insulin lispro (HumaLOG) corrective regimen sliding scale   SubCutaneous three times a day before meals  insulin lispro Injectable (HumaLOG) 4 Unit(s) SubCutaneous three times a day before meals  latanoprost 0.005% Ophthalmic Solution 1 Drop(s) Both EYES at bedtime  metoprolol succinate  milliGRAM(s) Oral daily  metroNIDAZOLE  IVPB      metroNIDAZOLE  IVPB 500 milliGRAM(s) IV Intermittent every 8 hours  montelukast 10 milliGRAM(s) Oral daily  morphine  - Injectable 2 milliGRAM(s) IV Push Once  pantoprazole  Injectable 40 milliGRAM(s) IV Push two times a day  sodium chloride 0.9%. 1000 milliLiter(s) (125 mL/Hr) IV Continuous <Continuous>    MEDICATIONS  (PRN):  ALBUTerol    90 MICROgram(s) HFA Inhaler 2 Puff(s) Inhalation every 6 hours PRN Bronchospasm  dextrose 40% Gel 15 Gram(s) Oral once PRN Blood Glucose LESS THAN 70 milliGRAM(s)/deciliter  glucagon  Injectable 1 milliGRAM(s) IntraMuscular once PRN Glucose LESS THAN 70 milligrams/deciliter      Vital Signs Last 24 Hrs  T(C): 36.7 (03 Oct 2019 10:53), Max: 36.7 (03 Oct 2019 05:12)  T(F): 98 (03 Oct 2019 10:53), Max: 98 (03 Oct 2019 05:12)  HR: 76 (03 Oct 2019 12:44) (68 - 79)  BP: 115/60 (03 Oct 2019 10:53) (115/60 - 145/80)  BP(mean): --  RR: 18 (03 Oct 2019 10:53) (16 - 18)  SpO2: 97% (03 Oct 2019 12:44) (94% - 100%)    PHYSICAL EXAM:    GENERAL: NAD, well-groomed  HEAD:  Atraumatic, Normocephalic  EYES: EOMI, PERRLA, conjunctiva and sclera clear  ENMT: No tonsillar erythema, exudates, or enlargement; Moist mucous membranes  NECK: Supple, No JVD  CHEST/LUNG: Clear to percussion bilaterally; No rales, rhonchi, wheezing, or rubs  HEART: Regular rate and rhythm; No murmurs, rubs, or gallops  ABDOMEN: Soft, Nontender, Nondistended; Bowel sounds present  EXTREMITIES:  2+ Peripheral Pulses, No clubbing, cyanosis, or edema  LYMPH: No lymphadenopathy noted  SKIN: No rashes or lesions    LABS:  CBC Full  -  ( 03 Oct 2019 05:30 )  WBC Count : 8.82 K/uL  RBC Count : 4.37 M/uL  Hemoglobin : 12.6 g/dL  Hematocrit : 38.4 %  Platelet Count - Automated : 205 K/uL  Mean Cell Volume : 87.9 fL  Mean Cell Hemoglobin : 28.8 pg  Mean Cell Hemoglobin Concentration : 32.8 %  Auto Neutrophil # : x  Auto Lymphocyte # : x  Auto Monocyte # : x  Auto Eosinophil # : x  Auto Basophil # : x  Auto Neutrophil % : x  Auto Lymphocyte % : x  Auto Monocyte % : x  Auto Eosinophil % : x  Auto Basophil % : x      10-03    145  |  112<H>  |  15  ----------------------------<  100<H>  4.1   |  22  |  1.23    Ca    9.4      03 Oct 2019 05:30    TPro  6.9  /  Alb  3.8  /  TBili  0.5  /  DBili  x   /  AST  14  /  ALT  11  /  AlkPhos  78  10-03      LIVER FUNCTIONS - ( 03 Oct 2019 05:30 )  Alb: 3.8 g/dL / Pro: 6.9 g/dL / ALK PHOS: 78 u/L / ALT: 11 u/L / AST: 14 u/L / GGT: x                               MICROBIOLOGY:            RADIOLOGY:      < from: CT Abdomen and Pelvis w/ IV Cont (10.02.19 @ 03:34) >  FINDINGS:    LOWER CHEST: Within normal limits.  LIVER: Within normal limits.  BILE DUCTS: Normal caliber.  GALLBLADDER: Cholecystectomy.    SPLEEN: Within normal limits.  PANCREAS: Within normal limits.  ADRENALS: Within normal limits.    KIDNEYS/URETERS: Within normal limits.  BLADDER: Within normal limits.  REPRODUCTIVE ORGANS: Hysterectomy.    BOWEL: Long segment thickening of the colon extending from the mid   transverse to the mid descending colon with adjacent fatty   stranding.Colonic diverticulosis without diverticulitis.No bowel   obstruction. Normal appendix.   PERITONEUM: No free air.    VESSELS: Atherosclerotic changes.   RETROPERITONEUM/LYMPH NODES: No lymphadenopathy.    ABDOMINAL WALL: Ventral abdominal wall hernia mesh repair.  BONES: Multilevel thoracic spondylosis.    IMPRESSION:     Long segment thickening of the colon centered on the splenic flexure   extending from the mid transverse colon to the mid descending colon due   to infectious or inflammatory colitis. Due to the watershed location   however, ischemic colitis should be considered.    < end of copied text >

## 2019-10-03 NOTE — PROGRESS NOTE ADULT - SUBJECTIVE AND OBJECTIVE BOX
Subjective: Patient seen and examined. No new events except as noted.     SUBJECTIVE/ROS:  feels ok       MEDICATIONS:  MEDICATIONS  (STANDING):  atorvastatin 20 milliGRAM(s) Oral at bedtime  buDESOnide  80 MICROgram(s)/formoterol 4.5 MICROgram(s) Inhaler 2 Puff(s) Inhalation two times a day  ciprofloxacin   IVPB 400 milliGRAM(s) IV Intermittent every 12 hours  dextrose 5%. 1000 milliLiter(s) (50 mL/Hr) IV Continuous <Continuous>  dextrose 50% Injectable 12.5 Gram(s) IV Push once  dextrose 50% Injectable 25 Gram(s) IV Push once  dextrose 50% Injectable 25 Gram(s) IV Push once  furosemide    Tablet 40 milliGRAM(s) Oral daily  influenza   Vaccine 0.5 milliLiter(s) IntraMuscular once  insulin glargine Injectable (LANTUS) 6 Unit(s) SubCutaneous at bedtime  insulin lispro (HumaLOG) corrective regimen sliding scale   SubCutaneous three times a day before meals  insulin lispro Injectable (HumaLOG) 4 Unit(s) SubCutaneous three times a day before meals  latanoprost 0.005% Ophthalmic Solution 1 Drop(s) Both EYES at bedtime  metoprolol succinate  milliGRAM(s) Oral daily  metroNIDAZOLE  IVPB      metroNIDAZOLE  IVPB 500 milliGRAM(s) IV Intermittent every 8 hours  montelukast 10 milliGRAM(s) Oral daily  morphine  - Injectable 2 milliGRAM(s) IV Push Once  pantoprazole  Injectable 40 milliGRAM(s) IV Push two times a day  sodium chloride 0.9%. 1000 milliLiter(s) (125 mL/Hr) IV Continuous <Continuous>      PHYSICAL EXAM:  T(C): 36.7 (10-03-19 @ 05:12), Max: 36.7 (10-03-19 @ 05:12)  HR: 78 (10-03-19 @ 08:23) (68 - 79)  BP: 128/74 (10-03-19 @ 05:12) (120/63 - 145/80)  RR: 18 (10-03-19 @ 05:12) (16 - 18)  SpO2: 98% (10-03-19 @ 08:23) (94% - 100%)  Wt(kg): --  I&O's Summary    Height (cm): 162.6 (10-02 @ 18:53)  Weight (kg): 100 (10-02 @ 18:53)  BMI (kg/m2): 37.8 (10-02 @ 18:53)  BSA (m2): 2.04 (10-02 @ 18:53)      JVP: Normal  Neck: supple  Lung: clear   CV: S1 S2 , Murmur:  Abd: soft  Ext: No edema  neuro: Awake / alert  Psych: flat affect  Skin: normal``    LABS/DATA:    CARDIAC MARKERS:                                12.6   8.82  )-----------( 205      ( 03 Oct 2019 05:30 )             38.4     10-03    145  |  112<H>  |  15  ----------------------------<  100<H>  4.1   |  22  |  1.23    Ca    9.4      03 Oct 2019 05:30    TPro  6.9  /  Alb  3.8  /  TBili  0.5  /  DBili  x   /  AST  14  /  ALT  11  /  AlkPhos  78  10-03    proBNP:   Lipid Profile:   HgA1c: Hemoglobin A1C, Whole Blood: 7.6 % (10-03 @ 05:30)    TSH:     TELE:  EKG:

## 2019-10-03 NOTE — CONSULT NOTE ADULT - ASSESSMENT
69yF w/pmhx DM, HTN, HLD, asthma, GERD p/w abdominal pain and bloody bowel movements with CT Abd notable for colitis
69yF w/pmhx DM, HTN, HLD, asthma, GERD p/w abdominal pain and bloody bowel movements. Pt states last night she developed nausea, vomiting, diarrhea and lower abdominal pain. Pt states she has had multiple episodes of diarrhea with the most recent two being only blood, she states it is dark red in color without feces mixed in. She states she has had 3 total episodes of emesis and last episode tonight looked the same as her bloody BM. Pt states she has lower abdominal pain, crampy in nature. Associated she has dizziness/lightheadedness. Reports normal colonoscopy 3-4 years ago. Pt denies fever/chills, sick contacts, recent travel, back pain, chest pain, shortness of breath, palpitations, dysuria, urinary frequency or any other concerns.
Assessment  DMT2: 69y Female with DM T2 with hyperglycemia, was on oral meds and insulin at home, started on low dose basal bolus insulin, blood sugars in acceptable range, no hypoglycemic episode, eating meals,  non compliant with low carb diet.  Gastroenteritis: on medications, stable, monitored.  HTN: Controlled,  on antihypertensive medications.  Obesity: No strict exercise routines, not on any weight loss program, neither on low calorie diet.          Ramon Crooks MD  Cell: [phone]  Office: [phone]
HTN  stable  cont current meds    DM  Monitor finger stick. Insulin coverage. Diabetic education and Diabetic diet. Consider nutrition consultation.    Abd pain   plan for EGD/Colonoscopy     PreOP   Based on current ACC/AHA guidelines, patient history and physical exam, the patient is considered to have low risk  pt had recent cath april of 2019 for palpitation and by self report it was unremarkable   no absolute CV objection to proceed to planned procedure
69yF w/pmhx DM, HTN, HLD, asthma, GERD p/w abdominal pain and bloody bowel movements. Pt states last night she developed nausea, vomiting, diarrhea and lower abdominal pain. Pt states she has had multiple episodes of diarrhea with the most recent two being only blood, she states it is dark red in color without feces mixed in. She states she has had 3 total episodes of emesis and last episode tonight looked the same as her bloody BM. Pt states she has lower abdominal pain, crampy in nature. Associated she has dizziness/lightheadedness. Reports normal colonoscopy 3-4 years ago. Pt denies fever/chills, sick contacts, recent travel, back pain, chest pain, shortness of breath, palpitations, dysuria, urinary frequency or any other concerns. (02 Oct 2019 11:29)    WBC 11.7 --> 8.8.  Afebrile, normotensive.  CTap shows long segment thickening of the colon  centered on the splenic flexure extending from the mid transverse colon to the mid descending colon due to infectious or inflammatory colitis.  Pt initially given zosyn, now on cipro/flagyl.   Pt for endoscopy.   ID consult called for further abx recommendations    r/o Infectious vs. ischemic colitis:    - Pt without fever, no recent sick contacts, no dietary changes, no recent abx exposure.  (+) GIB.  Pt with abd pain, hematemesis and rectal bleeding.  Monitor H/H and transfuse PRN    - Cont cipro/flagyl on empiric basis.  Check GI pcr, Stool O&P, stool cutlures,    - Pt planned for endoscopy.   CT (+) left sided colitis.     - Monitor stool output, serial abd exams.      - Monitor WBC and temp curve.  If pt spikes fever > 100.4, send bcx x 2      Will follow,    Shahnaz Mejia  [phone]

## 2019-10-04 ENCOUNTER — TRANSCRIPTION ENCOUNTER (OUTPATIENT)
Age: 69
End: 2019-10-04

## 2019-10-04 LAB
ANION GAP SERPL CALC-SCNC: 12 MMO/L — SIGNIFICANT CHANGE UP (ref 7–14)
BUN SERPL-MCNC: 13 MG/DL — SIGNIFICANT CHANGE UP (ref 7–23)
CALCIUM SERPL-MCNC: 9.2 MG/DL — SIGNIFICANT CHANGE UP (ref 8.4–10.5)
CHLORIDE SERPL-SCNC: 109 MMOL/L — HIGH (ref 98–107)
CK MB BLD-MCNC: 1.82 NG/ML — SIGNIFICANT CHANGE UP (ref 1–4.7)
CK MB BLD-MCNC: SIGNIFICANT CHANGE UP (ref 0–2.5)
CK SERPL-CCNC: 128 U/L — SIGNIFICANT CHANGE UP (ref 25–170)
CO2 SERPL-SCNC: 22 MMOL/L — SIGNIFICANT CHANGE UP (ref 22–31)
CREAT SERPL-MCNC: 1.08 MG/DL — SIGNIFICANT CHANGE UP (ref 0.5–1.3)
GLUCOSE BLDC GLUCOMTR-MCNC: 101 MG/DL — HIGH (ref 70–99)
GLUCOSE BLDC GLUCOMTR-MCNC: 133 MG/DL — HIGH (ref 70–99)
GLUCOSE BLDC GLUCOMTR-MCNC: 151 MG/DL — HIGH (ref 70–99)
GLUCOSE BLDC GLUCOMTR-MCNC: 186 MG/DL — HIGH (ref 70–99)
GLUCOSE BLDC GLUCOMTR-MCNC: 203 MG/DL — HIGH (ref 70–99)
GLUCOSE SERPL-MCNC: 96 MG/DL — SIGNIFICANT CHANGE UP (ref 70–99)
HCT VFR BLD CALC: 35.3 % — SIGNIFICANT CHANGE UP (ref 34.5–45)
HGB BLD-MCNC: 11.5 G/DL — SIGNIFICANT CHANGE UP (ref 11.5–15.5)
MAGNESIUM SERPL-MCNC: 1.7 MG/DL — SIGNIFICANT CHANGE UP (ref 1.6–2.6)
MCHC RBC-ENTMCNC: 28.5 PG — SIGNIFICANT CHANGE UP (ref 27–34)
MCHC RBC-ENTMCNC: 32.6 % — SIGNIFICANT CHANGE UP (ref 32–36)
MCV RBC AUTO: 87.4 FL — SIGNIFICANT CHANGE UP (ref 80–100)
NRBC # FLD: 0 K/UL — SIGNIFICANT CHANGE UP (ref 0–0)
PHOSPHATE SERPL-MCNC: 3.2 MG/DL — SIGNIFICANT CHANGE UP (ref 2.5–4.5)
PLATELET # BLD AUTO: 194 K/UL — SIGNIFICANT CHANGE UP (ref 150–400)
PMV BLD: 11.6 FL — SIGNIFICANT CHANGE UP (ref 7–13)
POTASSIUM SERPL-MCNC: 3.3 MMOL/L — LOW (ref 3.5–5.3)
POTASSIUM SERPL-SCNC: 3.3 MMOL/L — LOW (ref 3.5–5.3)
RBC # BLD: 4.04 M/UL — SIGNIFICANT CHANGE UP (ref 3.8–5.2)
RBC # FLD: 15.1 % — HIGH (ref 10.3–14.5)
SODIUM SERPL-SCNC: 143 MMOL/L — SIGNIFICANT CHANGE UP (ref 135–145)
TROPONIN T, HIGH SENSITIVITY: 7 NG/L — SIGNIFICANT CHANGE UP (ref ?–14)
WBC # BLD: 7.47 K/UL — SIGNIFICANT CHANGE UP (ref 3.8–10.5)
WBC # FLD AUTO: 7.47 K/UL — SIGNIFICANT CHANGE UP (ref 3.8–10.5)

## 2019-10-04 PROCEDURE — 71045 X-RAY EXAM CHEST 1 VIEW: CPT | Mod: 26

## 2019-10-04 PROCEDURE — 93010 ELECTROCARDIOGRAM REPORT: CPT

## 2019-10-04 RX ORDER — POTASSIUM CHLORIDE 20 MEQ
40 PACKET (EA) ORAL ONCE
Refills: 0 | Status: COMPLETED | OUTPATIENT
Start: 2019-10-04 | End: 2019-10-04

## 2019-10-04 RX ORDER — FUROSEMIDE 40 MG
20 TABLET ORAL
Refills: 0 | Status: DISCONTINUED | OUTPATIENT
Start: 2019-10-06 | End: 2019-10-05

## 2019-10-04 RX ORDER — ACETAMINOPHEN 500 MG
650 TABLET ORAL ONCE
Refills: 0 | Status: COMPLETED | OUTPATIENT
Start: 2019-10-04 | End: 2019-10-04

## 2019-10-04 RX ADMIN — Medication 650 MILLIGRAM(S): at 06:32

## 2019-10-04 RX ADMIN — Medication 200 MILLIGRAM(S): at 18:05

## 2019-10-04 RX ADMIN — MONTELUKAST 10 MILLIGRAM(S): 4 TABLET, CHEWABLE ORAL at 12:35

## 2019-10-04 RX ADMIN — Medication 100 MILLIGRAM(S): at 10:01

## 2019-10-04 RX ADMIN — Medication 650 MILLIGRAM(S): at 18:34

## 2019-10-04 RX ADMIN — Medication 650 MILLIGRAM(S): at 18:04

## 2019-10-04 RX ADMIN — Medication 40 MILLIEQUIVALENT(S): at 10:02

## 2019-10-04 RX ADMIN — INSULIN GLARGINE 6 UNIT(S): 100 INJECTION, SOLUTION SUBCUTANEOUS at 22:18

## 2019-10-04 RX ADMIN — LATANOPROST 1 DROP(S): 0.05 SOLUTION/ DROPS OPHTHALMIC; TOPICAL at 22:17

## 2019-10-04 RX ADMIN — Medication 4 UNIT(S): at 18:05

## 2019-10-04 RX ADMIN — Medication 100 MILLIGRAM(S): at 08:42

## 2019-10-04 RX ADMIN — Medication 4 UNIT(S): at 12:25

## 2019-10-04 RX ADMIN — Medication 650 MILLIGRAM(S): at 07:02

## 2019-10-04 RX ADMIN — Medication 200 MILLIGRAM(S): at 06:34

## 2019-10-04 RX ADMIN — BUDESONIDE AND FORMOTEROL FUMARATE DIHYDRATE 2 PUFF(S): 160; 4.5 AEROSOL RESPIRATORY (INHALATION) at 08:41

## 2019-10-04 RX ADMIN — Medication 40 MILLIGRAM(S): at 13:05

## 2019-10-04 RX ADMIN — BUDESONIDE AND FORMOTEROL FUMARATE DIHYDRATE 2 PUFF(S): 160; 4.5 AEROSOL RESPIRATORY (INHALATION) at 22:17

## 2019-10-04 RX ADMIN — Medication 4 UNIT(S): at 08:41

## 2019-10-04 RX ADMIN — PANTOPRAZOLE SODIUM 40 MILLIGRAM(S): 20 TABLET, DELAYED RELEASE ORAL at 08:41

## 2019-10-04 RX ADMIN — ATORVASTATIN CALCIUM 20 MILLIGRAM(S): 80 TABLET, FILM COATED ORAL at 22:18

## 2019-10-04 RX ADMIN — Medication 100 MILLIGRAM(S): at 23:02

## 2019-10-04 RX ADMIN — Medication 1: at 18:05

## 2019-10-04 RX ADMIN — PANTOPRAZOLE SODIUM 40 MILLIGRAM(S): 20 TABLET, DELAYED RELEASE ORAL at 18:06

## 2019-10-04 RX ADMIN — Medication 2: at 12:25

## 2019-10-04 NOTE — PROGRESS NOTE ADULT - SUBJECTIVE AND OBJECTIVE BOX
Chief complaint  Patient is a 69y old  Female who presents with a chief complaint of abdominal pain (04 Oct 2019 06:53)   Review of systems  Patient in bed, looks comfortable, no fever, no hypoglycemia.    Labs and Fingersticks  CAPILLARY BLOOD GLUCOSE      POCT Blood Glucose.: 133 mg/dL (04 Oct 2019 08:10)  POCT Blood Glucose.: 101 mg/dL (04 Oct 2019 05:36)  POCT Blood Glucose.: 222 mg/dL (03 Oct 2019 20:54)  POCT Blood Glucose.: 103 mg/dL (03 Oct 2019 18:38)  POCT Blood Glucose.: 81 mg/dL (03 Oct 2019 17:12)  POCT Blood Glucose.: 100 mg/dL (03 Oct 2019 12:30)      Anion Gap, Serum: 12 (10-04 @ 05:12)  Anion Gap, Serum: 11 (10-03 @ 05:30)    Hemoglobin A1C, Whole Blood: 7.6 <H> (10-03 @ 05:30)    Calcium, Total Serum: 9.2 (10-04 @ 05:12)  Calcium, Total Serum: 9.4 (10-03 @ 05:30)  Albumin, Serum: 3.8 (10-03 @ 05:30)    Alanine Aminotransferase (ALT/SGPT): 11 (10-03 @ 05:30)  Alkaline Phosphatase, Serum: 78 (10-03 @ 05:30)  Aspartate Aminotransferase (AST/SGOT): 14 (10-03 @ 05:30)        10-04    143  |  109<H>  |  13  ----------------------------<  96  3.3<L>   |  22  |  1.08    Ca    9.2      04 Oct 2019 05:12  Phos  3.2     10-04  Mg     1.7     10-04    TPro  6.9  /  Alb  3.8  /  TBili  0.5  /  DBili  x   /  AST  14  /  ALT  11  /  AlkPhos  78  10-03                        11.5   7.47  )-----------( 194      ( 04 Oct 2019 05:12 )             35.3     Medications  MEDICATIONS  (STANDING):  atorvastatin 20 milliGRAM(s) Oral at bedtime  buDESOnide  80 MICROgram(s)/formoterol 4.5 MICROgram(s) Inhaler 2 Puff(s) Inhalation two times a day  ciprofloxacin   IVPB 400 milliGRAM(s) IV Intermittent every 12 hours  dextrose 5%. 1000 milliLiter(s) (50 mL/Hr) IV Continuous <Continuous>  dextrose 50% Injectable 12.5 Gram(s) IV Push once  dextrose 50% Injectable 25 Gram(s) IV Push once  dextrose 50% Injectable 25 Gram(s) IV Push once  furosemide    Tablet 40 milliGRAM(s) Oral daily  influenza   Vaccine 0.5 milliLiter(s) IntraMuscular once  insulin glargine Injectable (LANTUS) 6 Unit(s) SubCutaneous at bedtime  insulin lispro (HumaLOG) corrective regimen sliding scale   SubCutaneous three times a day before meals  insulin lispro Injectable (HumaLOG) 4 Unit(s) SubCutaneous three times a day before meals  latanoprost 0.005% Ophthalmic Solution 1 Drop(s) Both EYES at bedtime  metoprolol succinate  milliGRAM(s) Oral daily  metroNIDAZOLE  IVPB      metroNIDAZOLE  IVPB 500 milliGRAM(s) IV Intermittent every 8 hours  montelukast 10 milliGRAM(s) Oral daily  morphine  - Injectable 2 milliGRAM(s) IV Push Once  pantoprazole  Injectable 40 milliGRAM(s) IV Push two times a day  potassium chloride    Tablet ER 40 milliEquivalent(s) Oral once  sodium chloride 0.9%. 1000 milliLiter(s) (75 mL/Hr) IV Continuous <Continuous>      Physical Exam  General: Patient comfortable in bed  Vital Signs Last 12 Hrs  T(F): 98.1 (10-04-19 @ 05:43), Max: 98.1 (10-04-19 @ 05:43)  HR: 77 (10-04-19 @ 05:43) (66 - 77)  BP: 140/66 (10-04-19 @ 05:43) (140/66 - 140/66)  BP(mean): --  RR: 17 (10-04-19 @ 05:43) (17 - 17)  SpO2: 99% (10-04-19 @ 05:43) (96% - 99%)  Neck: No palpable thyroid nodules.  CVS: S1S2, No murmurs  Respiratory: No wheezing, no crepitations  GI: Abdomen soft, bowel sounds positive  Musculoskeletal:  edema lower extremities.   Skin: No skin rashes, no ecchymosis    Diagnostics    Hemoglobin A1C, Whole Blood: Routine (10-02 @ 17:19)

## 2019-10-04 NOTE — PROVIDER CONTACT NOTE (OTHER) - ACTION/TREATMENT ORDERED:
PA made aware. Stated to monitor IV site where cipro was running and infiltrated. Will continue to monitor.
Provider aware, states to apply warm packs, elevate and monitor infiltrated piv site. Provider aware of delay in abx administration and partial abx admin. Will c/t monitor.
check orthostatic bp before getting up again and hold bp medications until it is checked
push back the lasix to noon and give her metoprolol.  just watch the pt and give tylenol for pain.

## 2019-10-04 NOTE — PROGRESS NOTE ADULT - SUBJECTIVE AND OBJECTIVE BOX
Subjective: Patient seen and examined. No new events except as noted.     SUBJECTIVE/ROS:  this am has mild HA and dizziness  Orthostatic vitals neg  no cp or sob   no palpitation       MEDICATIONS:  MEDICATIONS  (STANDING):  atorvastatin 20 milliGRAM(s) Oral at bedtime  buDESOnide  80 MICROgram(s)/formoterol 4.5 MICROgram(s) Inhaler 2 Puff(s) Inhalation two times a day  ciprofloxacin   IVPB 400 milliGRAM(s) IV Intermittent every 12 hours  dextrose 5%. 1000 milliLiter(s) (50 mL/Hr) IV Continuous <Continuous>  dextrose 50% Injectable 12.5 Gram(s) IV Push once  dextrose 50% Injectable 25 Gram(s) IV Push once  dextrose 50% Injectable 25 Gram(s) IV Push once  furosemide    Tablet 40 milliGRAM(s) Oral daily  influenza   Vaccine 0.5 milliLiter(s) IntraMuscular once  insulin glargine Injectable (LANTUS) 6 Unit(s) SubCutaneous at bedtime  insulin lispro (HumaLOG) corrective regimen sliding scale   SubCutaneous three times a day before meals  insulin lispro Injectable (HumaLOG) 4 Unit(s) SubCutaneous three times a day before meals  latanoprost 0.005% Ophthalmic Solution 1 Drop(s) Both EYES at bedtime  metoprolol succinate  milliGRAM(s) Oral daily  metroNIDAZOLE  IVPB      metroNIDAZOLE  IVPB 500 milliGRAM(s) IV Intermittent every 8 hours  montelukast 10 milliGRAM(s) Oral daily  morphine  - Injectable 2 milliGRAM(s) IV Push Once  pantoprazole  Injectable 40 milliGRAM(s) IV Push two times a day  sodium chloride 0.9%. 1000 milliLiter(s) (75 mL/Hr) IV Continuous <Continuous>      PHYSICAL EXAM:  T(C): 36.7 (10-04-19 @ 05:43), Max: 36.7 (10-03-19 @ 10:53)  HR: 77 (10-04-19 @ 05:43) (66 - 83)  BP: 140/66 (10-04-19 @ 05:43) (115/60 - 144/89)  RR: 17 (10-04-19 @ 05:43) (17 - 81)  SpO2: 99% (10-04-19 @ 05:43) (94% - 100%)  Wt(kg): --  I&O's Summary    03 Oct 2019 07:01  -  04 Oct 2019 06:53  --------------------------------------------------------  IN: 2125 mL / OUT: 300 mL / NET: 1825 mL            JVP: Normal  Neck: supple  Lung: clear   CV: S1 S2 , Murmur:  Abd: soft  Ext: No edema  neuro: Awake / alert  Psych: flat affect  Skin: normal``    LABS/DATA:    CARDIAC MARKERS:                                12.6   8.82  )-----------( 205      ( 03 Oct 2019 05:30 )             38.4     10-03    145  |  112<H>  |  15  ----------------------------<  100<H>  4.1   |  22  |  1.23    Ca    9.4      03 Oct 2019 05:30    TPro  6.9  /  Alb  3.8  /  TBili  0.5  /  DBili  x   /  AST  14  /  ALT  11  /  AlkPhos  78  10-03    proBNP:   Lipid Profile:   HgA1c:   TSH:     TELE:  EKG:    < from: Upper Endoscopy (10.03.19 @ 14:29) >                                                                                   Findings:       The examined esophagus was normal.       The Z-line was regular and was found 37 cm from the incisors.       Patchy mild inflammation characterized by congestion (edema) and        erythema was found in the gastric body and in the gastric antrum.        Biopsies were taken with a cold forceps for histology.       The examined duodenum was normal.                                                                                   Impression:          - Chronic gastritis.                       -Otherwise unremarkable endoscopy  Recommendation:      - Follow up pathology results.                       - Advance diet as tolerated.                       -Continue treatment of colitis.                       -Repeat colonsocopy as an outpatient once acute issues                        have resolved.                                                                                   Attending Participation:       I personally performed the entire procedure.    < end of copied text >

## 2019-10-04 NOTE — PROVIDER CONTACT NOTE (OTHER) - ASSESSMENT
pt confused daze look is gone, pt axox4, no change in mental status.  pt just states she doesn't feel right

## 2019-10-04 NOTE — PROGRESS NOTE ADULT - SUBJECTIVE AND OBJECTIVE BOX
INTERVAL HPI/OVERNIGHT EVENTS:    soft/loose brown stool this morning   abd pain better, intermittent   no n/v    MEDICATIONS  (STANDING):  atorvastatin 20 milliGRAM(s) Oral at bedtime  buDESOnide  80 MICROgram(s)/formoterol 4.5 MICROgram(s) Inhaler 2 Puff(s) Inhalation two times a day  ciprofloxacin   IVPB 400 milliGRAM(s) IV Intermittent every 12 hours  dextrose 5%. 1000 milliLiter(s) (50 mL/Hr) IV Continuous <Continuous>  dextrose 50% Injectable 12.5 Gram(s) IV Push once  dextrose 50% Injectable 25 Gram(s) IV Push once  dextrose 50% Injectable 25 Gram(s) IV Push once  furosemide    Tablet 40 milliGRAM(s) Oral daily  influenza   Vaccine 0.5 milliLiter(s) IntraMuscular once  insulin glargine Injectable (LANTUS) 6 Unit(s) SubCutaneous at bedtime  insulin lispro (HumaLOG) corrective regimen sliding scale   SubCutaneous three times a day before meals  insulin lispro Injectable (HumaLOG) 4 Unit(s) SubCutaneous three times a day before meals  latanoprost 0.005% Ophthalmic Solution 1 Drop(s) Both EYES at bedtime  metoprolol succinate  milliGRAM(s) Oral daily  metroNIDAZOLE  IVPB      metroNIDAZOLE  IVPB 500 milliGRAM(s) IV Intermittent every 8 hours  montelukast 10 milliGRAM(s) Oral daily  morphine  - Injectable 2 milliGRAM(s) IV Push Once  pantoprazole  Injectable 40 milliGRAM(s) IV Push two times a day  sodium chloride 0.9%. 1000 milliLiter(s) (75 mL/Hr) IV Continuous <Continuous>    MEDICATIONS  (PRN):  ALBUTerol    90 MICROgram(s) HFA Inhaler 2 Puff(s) Inhalation every 6 hours PRN Bronchospasm  dextrose 40% Gel 15 Gram(s) Oral once PRN Blood Glucose LESS THAN 70 milliGRAM(s)/deciliter  glucagon  Injectable 1 milliGRAM(s) IntraMuscular once PRN Glucose LESS THAN 70 milligrams/deciliter      Allergies    No Known Allergies    Intolerances        Review of Systems:    General:  No wt loss, fevers, chills, night sweats, fatigue   Eyes:  Good vision, no reported pain  ENT:  No sore throat, pain, runny nose, dysphagia  CV:  No pain, palpitations, hypo/hypertension  Resp:  No dyspnea, cough, tachypnea, wheezing  GI:  No pain, No nausea, No vomiting, No diarrhea, No constipation, No weight loss, No fever, No pruritis, No rectal bleeding, No melena, No dysphagia  :  No pain, bleeding, incontinence, nocturia  Muscle:  No pain, weakness  Neuro:  No weakness, tingling, memory problems  Psych:  No fatigue, insomnia, mood problems, depression  Endocrine:  No polyuria, polydypsia, cold/heat intolerance  Heme:  No petechiae, ecchymosis, easy bruisability  Skin:  No rash, tattoos, scars, edema      Vital Signs Last 24 Hrs  T(C): 36.6 (04 Oct 2019 13:04), Max: 36.7 (03 Oct 2019 16:48)  T(F): 97.8 (04 Oct 2019 13:04), Max: 98.1 (03 Oct 2019 16:48)  HR: 74 (04 Oct 2019 13:04) (66 - 83)  BP: 126/74 (04 Oct 2019 13:04) (126/74 - 144/89)  BP(mean): --  RR: 18 (04 Oct 2019 13:04) (17 - 81)  SpO2: 100% (04 Oct 2019 13:04) (94% - 100%)    PHYSICAL EXAM:    Constitutional: NAD  HEENT: EOMI, throat clear  Neck: No LAD, supple  Respiratory: CTA and P  Cardiovascular: S1 and S2, RRR, no M  Gastrointestinal: BS+, soft, NT/ND, neg HSM,  Extremities: No peripheral edema, neg clubbing, cyanosis  Vascular: 2+ peripheral pulses  Neurological: A/O x 3, no focal deficits  Psychiatric: Normal mood, normal affect  Skin: No rashes      LABS:                        11.5   7.47  )-----------( 194      ( 04 Oct 2019 05:12 )             35.3     10-04    143  |  109<H>  |  13  ----------------------------<  96  3.3<L>   |  22  |  1.08    Ca    9.2      04 Oct 2019 05:12  Phos  3.2     10-04  Mg     1.7     10-04    TPro  6.9  /  Alb  3.8  /  TBili  0.5  /  DBili  x   /  AST  14  /  ALT  11  /  AlkPhos  78  10-03          RADIOLOGY & ADDITIONAL TESTS:    < from: Upper Endoscopy (10.03.19 @ 14:29) >    [facility]  _______________________________________________________________________________  Patient Name: Ree Travis   Procedure Date: 10/3/2019 2:29 PM  MRN: [id number]                     Account Number: [id number]  YOB: 1950              Admit Type: Inpatient  Room: [place] [address]                         Gender: Female  Attending MD: Saeid Oconnor MD      _______________________________________________________________________________     Procedure:           Upper GI endoscopy  Indications:         Hematemesis  Providers:           Saeid Oconnor MD  Medicines:           Monitored Anesthesia Care  Complications:       No immediate complications.  Procedure:           Pre-Anesthesia Assessment:                       - Prior to the procedure, a History and Physical was                        performed, and patient medications and allergies were                        reviewed. The patient is competent. The risks and    benefits of the procedure and the sedation options and                        risks were discussed with the patient. All questions                        were answered and informed consent was obtained. Patient                        identification and proposed procedure were verified by                        the physician, the nurse and the anesthesiologist in the                        pre-procedure area in the procedure room. Mental Status                        Examination: alert and oriented. Airway Examination:                        normal oropharyngeal airway and neck mobility.                        Respiratory Examination: clear to auscultation. CV                        Examination: normal. Prophylactic Antibiotics: The                patient does not require prophylactic antibiotics. Prior                        Anticoagulants: The patient has taken no previous                        anticoagulant or antiplatelet agents. ASA Grade                        Assessment: II - A patient with mild systemic disease.                        After reviewing the risks and benefits, the patient was                        deemed in satisfactory condition to undergo the                        procedure. The anesthesia plan was to use monitored                        anesthesia care (MAC). Immediately prior to                        administration of medications, the patient was                        re-assessed for adequacy to receive sedatives. The heart       rate, respiratory rate, oxygen saturations, blood                        pressure, adequacy of pulmonary ventilation, and                        response to care were monitored throughout the                        procedure. The physical status of the patient was                        re-assessed after the procedure.                       After obtaining informed consent, the endoscope was                        passed under direct vision. Throughout the procedure,                        the patient's blood pressure, pulse, and oxygen                        saturations were monitored continuously. The Endoscope                        was introduced through the mouth, and advanced to the                        third part of duodenum. Theupper GI endoscopy was                        accomplished without difficulty. The patient tolerated                        the procedure well.                                                                                   Findings:       The examined esophagus was normal.       The Z-line was regular and was found 37 cm from the incisors.       Patchy mild inflammation characterized by congestion (edema) and        erythema was found in the gastric body and in the gastric antrum.        Biopsies were taken with a cold forceps for histology.       The examined duodenum was normal.                                                                                   Impression:          - Chronic gastritis.                       -Otherwise unremarkable endoscopy  Recommendation:      - Follow up pathology results.                       - Advance diet as tolerated.                       -Continue treatment of colitis.                       -Repeat colonsocopy as an outpatient once acute issues                        have resolved.                                                                                   Attending Participation:       I personally performed the entire procedure.                                 Saeid Oconnor  ___________________  Saeid Oconnor MD  10/3/2019 3:00:27 PM  This report has been signed electronically.  Number of Addenda: 0    Note Initiated On: 10/3/2019 2:29 PM    < end of copied text >

## 2019-10-04 NOTE — PROVIDER CONTACT NOTE (OTHER) - SITUATION
pt states her light headed and dizziness decreases slightly as she sits up.  pt states she has bilateral neck pain and heaviness over her eyes.  pt just "doesn't feel right"

## 2019-10-04 NOTE — PROGRESS NOTE ADULT - SUBJECTIVE AND OBJECTIVE BOX
Patient is a 69y old  Female who presents with a chief complaint of abdominal pain (04 Oct 2019 09:48)      Any change in ROS: Doing pretty good: no SOB : s/p endoscopy:     MEDICATIONS  (STANDING):  atorvastatin 20 milliGRAM(s) Oral at bedtime  buDESOnide  80 MICROgram(s)/formoterol 4.5 MICROgram(s) Inhaler 2 Puff(s) Inhalation two times a day  ciprofloxacin   IVPB 400 milliGRAM(s) IV Intermittent every 12 hours  dextrose 5%. 1000 milliLiter(s) (50 mL/Hr) IV Continuous <Continuous>  dextrose 50% Injectable 12.5 Gram(s) IV Push once  dextrose 50% Injectable 25 Gram(s) IV Push once  dextrose 50% Injectable 25 Gram(s) IV Push once  furosemide    Tablet 40 milliGRAM(s) Oral daily  influenza   Vaccine 0.5 milliLiter(s) IntraMuscular once  insulin glargine Injectable (LANTUS) 6 Unit(s) SubCutaneous at bedtime  insulin lispro (HumaLOG) corrective regimen sliding scale   SubCutaneous three times a day before meals  insulin lispro Injectable (HumaLOG) 4 Unit(s) SubCutaneous three times a day before meals  latanoprost 0.005% Ophthalmic Solution 1 Drop(s) Both EYES at bedtime  metoprolol succinate  milliGRAM(s) Oral daily  metroNIDAZOLE  IVPB      metroNIDAZOLE  IVPB 500 milliGRAM(s) IV Intermittent every 8 hours  montelukast 10 milliGRAM(s) Oral daily  morphine  - Injectable 2 milliGRAM(s) IV Push Once  pantoprazole  Injectable 40 milliGRAM(s) IV Push two times a day  sodium chloride 0.9%. 1000 milliLiter(s) (75 mL/Hr) IV Continuous <Continuous>    MEDICATIONS  (PRN):  ALBUTerol    90 MICROgram(s) HFA Inhaler 2 Puff(s) Inhalation every 6 hours PRN Bronchospasm  dextrose 40% Gel 15 Gram(s) Oral once PRN Blood Glucose LESS THAN 70 milliGRAM(s)/deciliter  glucagon  Injectable 1 milliGRAM(s) IntraMuscular once PRN Glucose LESS THAN 70 milligrams/deciliter    Vital Signs Last 24 Hrs  T(C): 36.7 (04 Oct 2019 05:43), Max: 36.7 (03 Oct 2019 16:48)  T(F): 98.1 (04 Oct 2019 05:43), Max: 98.1 (03 Oct 2019 16:48)  HR: 77 (04 Oct 2019 05:43) (66 - 83)  BP: 140/66 (04 Oct 2019 05:43) (139/67 - 144/89)  BP(mean): --  RR: 17 (04 Oct 2019 05:43) (17 - 81)  SpO2: 99% (04 Oct 2019 05:43) (94% - 99%)    I&O's Summary    03 Oct 2019 07:01  -  04 Oct 2019 07:00  --------------------------------------------------------  IN: 2675 mL / OUT: 300 mL / NET: 2375 mL          Physical Exam:   GENERAL: NAD, well-groomed, well-developed  HEENT: ZACHARY/   Atraumatic, Normocephalic  ENMT: No tonsillar erythema, exudates, or enlargement; Moist mucous membranes, Good dentition, No lesions  NECK: Supple, No JVD, Normal thyroid  CHEST/LUNG: Clear to auscultaion, ; No rales, rhonchi, wheezing, or rubs  CVS: Regular rate and rhythm; No murmurs, rubs, or gallops  GI: : Soft, Nontender, Nondistended; Bowel sounds present  NERVOUS SYSTEM:  Alert & Oriented X3  EXTREMITIES:  2+ Peripheral Pulses, No clubbing, cyanosis, or edema  LYMPH: No lymphadenopathy noted  SKIN: No rashes or lesions  ENDOCRINOLOGY: No Thyromegaly  PSYCH: Appropriate    Labs:  24                            11.5   7.47  )-----------( 194      ( 04 Oct 2019 05:12 )             35.3                         12.6   8.82  )-----------( 205      ( 03 Oct 2019 05:30 )             38.4                         13.0   10.20 )-----------( 227      ( 02 Oct 2019 07:10 )             40.0                         14.0   11.73 )-----------( 246      ( 02 Oct 2019 03:00 )             43.7     10-04    143  |  109<H>  |  13  ----------------------------<  96  3.3<L>   |  22  |  1.08  10-03    145  |  112<H>  |  15  ----------------------------<  100<H>  4.1   |  22  |  1.23  10-02    145  |  108<H>  |  17  ----------------------------<  155<H>  4.7   |  20<L>  |  1.09    Ca    9.2      04 Oct 2019 05:12  Ca    9.4      03 Oct 2019 05:30  Phos  3.2     10-04  Mg     1.7     10-04    TPro  6.9  /  Alb  3.8  /  TBili  0.5  /  DBili  x   /  AST  14  /  ALT  11  /  AlkPhos  78  10-03  TPro  8.0  /  Alb  4.3  /  TBili  0.4  /  DBili  x   /  AST  24  /  ALT  15  /  AlkPhos  92  10-02    CAPILLARY BLOOD GLUCOSE      POCT Blood Glucose.: 203 mg/dL (04 Oct 2019 12:02)  POCT Blood Glucose.: 133 mg/dL (04 Oct 2019 08:10)  POCT Blood Glucose.: 101 mg/dL (04 Oct 2019 05:36)  POCT Blood Glucose.: 222 mg/dL (03 Oct 2019 20:54)  POCT Blood Glucose.: 103 mg/dL (03 Oct 2019 18:38)  POCT Blood Glucose.: 81 mg/dL (03 Oct 2019 17:12)      LIVER FUNCTIONS - ( 03 Oct 2019 05:30 )  Alb: 3.8 g/dL / Pro: 6.9 g/dL / ALK PHOS: 78 u/L / ALT: 11 u/L / AST: 14 u/L / GGT: x               < from: CT Abdomen and Pelvis w/ IV Cont (10.02.19 @ 03:34) >  LADDER: Cholecystectomy.    SPLEEN: Within normal limits.  PANCREAS: Within normal limits.  ADRENALS: Within normal limits.    KIDNEYS/URETERS: Within normal limits.  BLADDER: Within normal limits.  REPRODUCTIVE ORGANS: Hysterectomy.    BOWEL: Long segment thickening of the colon extending from the mid   transverse to the mid descending colon with adjacent fatty   stranding.Colonic diverticulosis without diverticulitis.No bowel   obstruction. Normal appendix.   PERITONEUM: No free air.    VESSELS: Atherosclerotic changes.   RETROPERITONEUM/LYMPH NODES: No lymphadenopathy.    ABDOMINAL WALL: Ventral abdominal wall hernia mesh repair.  BONES: Multilevel thoracic spondylosis.    IMPRESSION:     Long segment thickening of the colon centered on the splenic flexure   extending from the mid transverse colon to the mid descending colon due   to infectious or inflammatory colitis. Due to the watershed location   however, ischemic colitis should be considered.                  NAHID IQBAL M.D., RADIOLOGY RESIDENT  This document has been electronically signed.  SHAWN TEJEDA M.D., ATTENDING RADIOLOGIST  This document has been electronically signed. Oct  2 2019  5:47AM        < end of copied text >        RECENT CULTURES:        RESPIRATORY CULTURES:          Studies  Chest X-RAY  CT SCAN Chest   Venous Dopplers: LE:   CT Abdomen  Others  < from: Upper Endoscopy (10.03.19 @ 14:29) >                       the patient's blood pressure, pulse, and oxygen                        saturations were monitored continuously. The Endoscope                        was introduced through the mouth, and advanced to the                        third part of duodenum. Theupper GI endoscopy was                        accomplished without difficulty. The patient tolerated                        the procedure well.                                                                                   Findings:       The examined esophagus was normal.       The Z-line was regular and was found 37 cm from the incisors.       Patchy mild inflammation characterized by congestion (edema) and        erythema was found in the gastric body and in the gastric antrum.        Biopsies were taken with a cold forceps for histology.       The examined duodenum was normal.                                                                                   Impression:          - Chronic gastritis.                       -Otherwise unremarkable endoscopy  Recommendation:      - Follow up pathology results.                       - Advance diet as tolerated.                       -Continue treatment of colitis.                       -Repeat colonsocopy as an outpatient once acute issues                        have resolved.                                                                                   Attending Participation:    < end of copied text >

## 2019-10-04 NOTE — PROVIDER CONTACT NOTE (OTHER) - REASON
Tried to put an IV in and could not get it. Waiting for Clinical impact nurse. IV antibiotics are on hold until IV can be put in.

## 2019-10-04 NOTE — PROVIDER CONTACT NOTE (OTHER) - ASSESSMENT
pt sitting at edge of bed leaning over, complaining of dizziness and light headed.  pt has the sense of confusion on her face

## 2019-10-04 NOTE — PROGRESS NOTE ADULT - SUBJECTIVE AND OBJECTIVE BOX
Patient is a 69y old  Female who presents with a chief complaint of abdominal pain (04 Oct 2019 13:15)      INTERVAL HPI/OVERNIGHT EVENTS:  T(C): 36.6 (10-04-19 @ 13:04), Max: 36.7 (10-04-19 @ 05:43)  HR: 74 (10-04-19 @ 13:04) (66 - 81)  BP: 126/74 (10-04-19 @ 13:04) (126/74 - 140/66)  RR: 18 (10-04-19 @ 13:04) (17 - 81)  SpO2: 100% (10-04-19 @ 13:04) (94% - 100%)  Wt(kg): --  I&O's Summary    03 Oct 2019 07:01  -  04 Oct 2019 07:00  --------------------------------------------------------  IN: 2675 mL / OUT: 300 mL / NET: 2375 mL        LABS:                        11.5   7.47  )-----------( 194      ( 04 Oct 2019 05:12 )             35.3     10-04    143  |  109<H>  |  13  ----------------------------<  96  3.3<L>   |  22  |  1.08    Ca    9.2      04 Oct 2019 05:12  Phos  3.2     10-04  Mg     1.7     10-04    TPro  6.9  /  Alb  3.8  /  TBili  0.5  /  DBili  x   /  AST  14  /  ALT  11  /  AlkPhos  78  10-03        CAPILLARY BLOOD GLUCOSE      POCT Blood Glucose.: 151 mg/dL (04 Oct 2019 17:40)  POCT Blood Glucose.: 203 mg/dL (04 Oct 2019 12:02)  POCT Blood Glucose.: 133 mg/dL (04 Oct 2019 08:10)  POCT Blood Glucose.: 101 mg/dL (04 Oct 2019 05:36)  POCT Blood Glucose.: 222 mg/dL (03 Oct 2019 20:54)  POCT Blood Glucose.: 103 mg/dL (03 Oct 2019 18:38)            MEDICATIONS  (STANDING):  atorvastatin 20 milliGRAM(s) Oral at bedtime  buDESOnide  80 MICROgram(s)/formoterol 4.5 MICROgram(s) Inhaler 2 Puff(s) Inhalation two times a day  ciprofloxacin   IVPB 400 milliGRAM(s) IV Intermittent every 12 hours  dextrose 5%. 1000 milliLiter(s) (50 mL/Hr) IV Continuous <Continuous>  dextrose 50% Injectable 12.5 Gram(s) IV Push once  dextrose 50% Injectable 25 Gram(s) IV Push once  dextrose 50% Injectable 25 Gram(s) IV Push once  furosemide    Tablet 20 milliGRAM(s) Oral <User Schedule>  influenza   Vaccine 0.5 milliLiter(s) IntraMuscular once  insulin glargine Injectable (LANTUS) 6 Unit(s) SubCutaneous at bedtime  insulin lispro (HumaLOG) corrective regimen sliding scale   SubCutaneous three times a day before meals  insulin lispro Injectable (HumaLOG) 4 Unit(s) SubCutaneous three times a day before meals  latanoprost 0.005% Ophthalmic Solution 1 Drop(s) Both EYES at bedtime  metoprolol succinate  milliGRAM(s) Oral daily  metroNIDAZOLE  IVPB      metroNIDAZOLE  IVPB 500 milliGRAM(s) IV Intermittent every 8 hours  montelukast 10 milliGRAM(s) Oral daily  morphine  - Injectable 2 milliGRAM(s) IV Push Once  pantoprazole  Injectable 40 milliGRAM(s) IV Push two times a day    MEDICATIONS  (PRN):  ALBUTerol    90 MICROgram(s) HFA Inhaler 2 Puff(s) Inhalation every 6 hours PRN Bronchospasm  dextrose 40% Gel 15 Gram(s) Oral once PRN Blood Glucose LESS THAN 70 milliGRAM(s)/deciliter  glucagon  Injectable 1 milliGRAM(s) IntraMuscular once PRN Glucose LESS THAN 70 milligrams/deciliter          PHYSICAL EXAM:  GENERAL: NAD, well-groomed, well-developed  HEAD:  Atraumatic, Normocephalic  CHEST/LUNG: Clear to percussion bilaterally; No rales, rhonchi, wheezing, or rubs  HEART: Regular rate and rhythm; No murmurs, rubs, or gallops  ABDOMEN: Soft, Nontender, Nondistended; Bowel sounds present  EXTREMITIES:  2+ Peripheral Pulses, No clubbing, cyanosis, or edema  LYMPH: No lymphadenopathy noted  SKIN: No rashes or lesions    Care Discussed with Consultants/Other Providers [ ] YES  [ ] NO

## 2019-10-04 NOTE — CHART NOTE - NSCHARTNOTEFT_GEN_A_CORE
Patient complaining of palpitations and chest pain. Patient seen and examined at bedside.    Patient relaying history of palpitations in the past "hundreds of times before" and this event feels similar. However, she states that the chest pain is new and has never been associated with palpitations. Patient also relaying new history of CHF that is not documented in chart. She states that the pain is sharp and mainly on the L chest wall tracking from her sternum along L breast line and not exacerbated by positioning. Of note, patient states that she feels mildly anxious since this morning. She denies visual changes, HA, SOB, N/V, abdominal pain.    Gen: NAD, resting in bed  Chest: Point tenderness along L sternum tracking to L axillary line, CTABL, no wheezes, ronchi, rales  CV: No audible skipped beats, RRR, good S1 S1, no murmurs, rubs, gallops  Abd: NTND  Ext: Peripheral pulses 2+    Plan:  - Suspect component of anxiety, however, given PmHx will treat as R/O ACS  - EKG reviewed with no apparent signs of ischemia, no EKG to compare against  - CXR pending  - Troponins, CKMB  - Tylenol ordered for pain control  - Attending made aware    Jose Alejandro Muhammad PA-C

## 2019-10-04 NOTE — DISCHARGE NOTE NURSING/CASE MANAGEMENT/SOCIAL WORK - PATIENT PORTAL LINK FT
You can access the FollowMyHealth Patient Portal offered by [facility] by registering at the following website: [email or web address]. By joining [employer]’s FollowMyHealth portal, you will also be able to view your health information using other applications (apps) compatible with our system.

## 2019-10-04 NOTE — PROVIDER CONTACT NOTE (OTHER) - ASSESSMENT
PIV infiltrated; Cipro infusing at time of infiltration. +3 edema to rex-insertion PIV site, no redness noted, + tenderness, PIV flushed without difficulty. Warm packs applied to PIV site, PIV dc'ed and insertion site dressed with gauze and tape. RN attempted PIV insertion x2 with no success. RN dc'ed cipro when RN visualized infiltration, only +/- 100mL cipro infused.

## 2019-10-04 NOTE — PROVIDER CONTACT NOTE (CHANGE IN STATUS NOTIFICATION) - ASSESSMENT
Pt c/o heart palpitations, c/o "chest heaviness", denies cp, sob, ha n/v. Endorses palpitations began "a couple of minutes ago" accompanied by "chest heaviness". VSS. no s/s acute distress, up in chair. States pt does have palpitations at home.

## 2019-10-04 NOTE — PROGRESS NOTE ADULT - SUBJECTIVE AND OBJECTIVE BOX
Infectious Diseases progress note:    Subjective:  NAD, afebrile.  States abd pain improved, still with some loose stools.  No n/v, no melena or BRBPR.  Pt s/p EGD.      ROS:  CONSTITUTIONAL:  No fever, chills, rigors  CARDIOVASCULAR:  No chest pain or palpitations  RESPIRATORY:   No SOB, cough, dyspnea on exertion.  No wheezing  GASTROINTESTINAL:  No abd pain, N/V, diarrhea/constipation  EXTREMITIES:  No swelling or joint pain  GENITOURINARY:  No burning on urination, increased frequency or urgency.  No flank pain  NEUROLOGIC:  No HA, visual disturbances  SKIN: No rashes    Allergies    No Known Allergies    Intolerances        ANTIBIOTICS/RELEVANT:  antimicrobials  ciprofloxacin   IVPB 400 milliGRAM(s) IV Intermittent every 12 hours  metroNIDAZOLE  IVPB      metroNIDAZOLE  IVPB 500 milliGRAM(s) IV Intermittent every 8 hours    immunologic:  influenza   Vaccine 0.5 milliLiter(s) IntraMuscular once    OTHER:  ALBUTerol    90 MICROgram(s) HFA Inhaler 2 Puff(s) Inhalation every 6 hours PRN  atorvastatin 20 milliGRAM(s) Oral at bedtime  buDESOnide  80 MICROgram(s)/formoterol 4.5 MICROgram(s) Inhaler 2 Puff(s) Inhalation two times a day  dextrose 40% Gel 15 Gram(s) Oral once PRN  dextrose 5%. 1000 milliLiter(s) IV Continuous <Continuous>  dextrose 50% Injectable 12.5 Gram(s) IV Push once  dextrose 50% Injectable 25 Gram(s) IV Push once  dextrose 50% Injectable 25 Gram(s) IV Push once  furosemide    Tablet 20 milliGRAM(s) Oral <User Schedule>  glucagon  Injectable 1 milliGRAM(s) IntraMuscular once PRN  insulin glargine Injectable (LANTUS) 6 Unit(s) SubCutaneous at bedtime  insulin lispro (HumaLOG) corrective regimen sliding scale   SubCutaneous three times a day before meals  insulin lispro Injectable (HumaLOG) 4 Unit(s) SubCutaneous three times a day before meals  latanoprost 0.005% Ophthalmic Solution 1 Drop(s) Both EYES at bedtime  metoprolol succinate  milliGRAM(s) Oral daily  montelukast 10 milliGRAM(s) Oral daily  morphine  - Injectable 2 milliGRAM(s) IV Push Once  pantoprazole  Injectable 40 milliGRAM(s) IV Push two times a day      Objective:  Vital Signs Last 24 Hrs  T(C): 36.7 (05 Oct 2019 06:07), Max: 36.8 (04 Oct 2019 20:56)  T(F): 98 (05 Oct 2019 06:07), Max: 98.3 (04 Oct 2019 20:56)  HR: 69 (05 Oct 2019 09:19) (64 - 82)  BP: 142/75 (05 Oct 2019 06:07) (121/70 - 142/75)  BP(mean): --  RR: 19 (05 Oct 2019 06:07) (18 - 20)  SpO2: 97% (05 Oct 2019 09:19) (97% - 100%)    PHYSICAL EXAM:  Constitutional:NAD  Eyes:ZACHARY, EOMI  Ear/Nose/Throat: no thrush, mucositis.  Moist mucous membranes	  Neck:no JVD, no lymphadenopathy, supple  Respiratory: CTA benitez  Cardiovascular: S1S2 RRR, no murmurs  Gastrointestinal:soft, nontender,  nondistended (+) BS  Extremities:no e/e/c  Skin:  no rashes, open wounds or ulcerations        LABS:                        12.2   7.18  )-----------( 197      ( 05 Oct 2019 05:50 )             38.2     10-05    141  |  109<H>  |  14  ----------------------------<  141<H>  3.7   |  22  |  1.01    Ca    9.5      05 Oct 2019 05:50  Phos  2.6     10-05  Mg     1.7     10-05                  MICROBIOLOGY:    Culture - Stool (10.03.19 @ 21:14)    Culture - Stool:   ****************** PRELIMINARY **************************  NEGATIVE FOR SALMONELLA, SHIGELLA, YERSINIA,  E. COLI O157, AEROMONAS, PLESIOMONAS, AND VIBRIO SP.                      AFTER 24 HOURS  *********************************************************    Specimen Source: FECES          RADIOLOGY & ADDITIONAL STUDIES:    < from: CT Abdomen and Pelvis w/ IV Cont (10.02.19 @ 03:34) >  EXAM:  CT ABDOMEN AND PELVIS IC        PROCEDURE DATE:  Oct  2 2019         INTERPRETATION:  CLINICAL INFORMATION: Lower abdominal pain with nausea   and vomiting.    COMPARISON: CT abdomen pelvis 2/27/2016.    PROCEDURE: CT of the Abdomen and Pelvis was performed with intravenous   contrast.   Intravenous contrast: 90 ml Omnipaque 350. 10 ml discarded.  Oral contrast: None.  Sagittal and coronal reformats were performed.    FINDINGS:    LOWER CHEST: Within normal limits.  LIVER: Within normal limits.  BILE DUCTS: Normal caliber.  GALLBLADDER: Cholecystectomy.    SPLEEN: Within normal limits.  PANCREAS: Within normal limits.  ADRENALS: Within normal limits.    KIDNEYS/URETERS: Within normal limits.  BLADDER: Within normal limits.  REPRODUCTIVE ORGANS: Hysterectomy.    BOWEL: Long segment thickening of the colon extending from the mid   transverse to the mid descending colon with adjacent fatty   stranding.Colonic diverticulosis without diverticulitis.No bowel   obstruction. Normal appendix.   PERITONEUM: No free air.    VESSELS: Atherosclerotic changes.   RETROPERITONEUM/LYMPH NODES: No lymphadenopathy.    ABDOMINAL WALL: Ventral abdominal wall hernia mesh repair.  BONES: Multilevel thoracic spondylosis.    IMPRESSION:     Long segment thickening of the colon centered on the splenic flexure   extending from the mid transverse colon to the mid descending colon due   to infectious or inflammatory colitis. Due to the watershed location   however, ischemic colitis should be considered.    < end of copied text >

## 2019-10-04 NOTE — PROGRESS NOTE ADULT - PROBLEM SELECTOR PLAN 1
- noted on CT   - complete 10 day total course of cipro/flagyl; change to po on discharge   - fu stool studies   - bacid tid   - pain control prn   - s/p EGD with chronic gastritis; fu pathology   - protonix qd   - low fiber/lactose free diet  - no gi objection to dc planning per primary team

## 2019-10-05 ENCOUNTER — TRANSCRIPTION ENCOUNTER (OUTPATIENT)
Age: 69
End: 2019-10-05

## 2019-10-05 VITALS
DIASTOLIC BLOOD PRESSURE: 76 MMHG | HEART RATE: 74 BPM | SYSTOLIC BLOOD PRESSURE: 136 MMHG | TEMPERATURE: 98 F | RESPIRATION RATE: 18 BRPM | OXYGEN SATURATION: 99 %

## 2019-10-05 LAB
ANION GAP SERPL CALC-SCNC: 10 MMO/L — SIGNIFICANT CHANGE UP (ref 7–14)
BUN SERPL-MCNC: 14 MG/DL — SIGNIFICANT CHANGE UP (ref 7–23)
CALCIUM SERPL-MCNC: 9.5 MG/DL — SIGNIFICANT CHANGE UP (ref 8.4–10.5)
CHLORIDE SERPL-SCNC: 109 MMOL/L — HIGH (ref 98–107)
CO2 SERPL-SCNC: 22 MMOL/L — SIGNIFICANT CHANGE UP (ref 22–31)
CREAT SERPL-MCNC: 1.01 MG/DL — SIGNIFICANT CHANGE UP (ref 0.5–1.3)
GLUCOSE BLDC GLUCOMTR-MCNC: 148 MG/DL — HIGH (ref 70–99)
GLUCOSE BLDC GLUCOMTR-MCNC: 233 MG/DL — HIGH (ref 70–99)
GLUCOSE SERPL-MCNC: 141 MG/DL — HIGH (ref 70–99)
HCT VFR BLD CALC: 38.2 % — SIGNIFICANT CHANGE UP (ref 34.5–45)
HGB BLD-MCNC: 12.2 G/DL — SIGNIFICANT CHANGE UP (ref 11.5–15.5)
MAGNESIUM SERPL-MCNC: 1.7 MG/DL — SIGNIFICANT CHANGE UP (ref 1.6–2.6)
MCHC RBC-ENTMCNC: 28.2 PG — SIGNIFICANT CHANGE UP (ref 27–34)
MCHC RBC-ENTMCNC: 31.9 % — LOW (ref 32–36)
MCV RBC AUTO: 88.4 FL — SIGNIFICANT CHANGE UP (ref 80–100)
NRBC # FLD: 0 K/UL — SIGNIFICANT CHANGE UP (ref 0–0)
PHOSPHATE SERPL-MCNC: 2.6 MG/DL — SIGNIFICANT CHANGE UP (ref 2.5–4.5)
PLATELET # BLD AUTO: 197 K/UL — SIGNIFICANT CHANGE UP (ref 150–400)
PMV BLD: 11.4 FL — SIGNIFICANT CHANGE UP (ref 7–13)
POTASSIUM SERPL-MCNC: 3.7 MMOL/L — SIGNIFICANT CHANGE UP (ref 3.5–5.3)
POTASSIUM SERPL-SCNC: 3.7 MMOL/L — SIGNIFICANT CHANGE UP (ref 3.5–5.3)
RBC # BLD: 4.32 M/UL — SIGNIFICANT CHANGE UP (ref 3.8–5.2)
RBC # FLD: 14.9 % — HIGH (ref 10.3–14.5)
SODIUM SERPL-SCNC: 141 MMOL/L — SIGNIFICANT CHANGE UP (ref 135–145)
SPECIMEN SOURCE: SIGNIFICANT CHANGE UP
WBC # BLD: 7.18 K/UL — SIGNIFICANT CHANGE UP (ref 3.8–10.5)
WBC # FLD AUTO: 7.18 K/UL — SIGNIFICANT CHANGE UP (ref 3.8–10.5)

## 2019-10-05 PROCEDURE — 93306 TTE W/DOPPLER COMPLETE: CPT | Mod: 26

## 2019-10-05 RX ORDER — INSULIN LISPRO 100/ML
6 VIAL (ML) SUBCUTANEOUS
Qty: 1 | Refills: 0
Start: 2019-10-05 | End: 2019-11-03

## 2019-10-05 RX ORDER — MOXIFLOXACIN HYDROCHLORIDE TABLETS, 400 MG 400 MG/1
1 TABLET, FILM COATED ORAL
Qty: 12 | Refills: 0
Start: 2019-10-05 | End: 2019-10-10

## 2019-10-05 RX ORDER — METRONIDAZOLE 500 MG
1 TABLET ORAL
Qty: 18 | Refills: 0
Start: 2019-10-05 | End: 2019-10-10

## 2019-10-05 RX ORDER — FUROSEMIDE 40 MG
1 TABLET ORAL
Qty: 0 | Refills: 0 | DISCHARGE
Start: 2019-10-05

## 2019-10-05 RX ORDER — INSULIN GLARGINE 100 [IU]/ML
52 INJECTION, SOLUTION SUBCUTANEOUS
Qty: 0 | Refills: 0 | DISCHARGE

## 2019-10-05 RX ORDER — FUROSEMIDE 40 MG
1 TABLET ORAL
Qty: 0 | Refills: 0 | DISCHARGE

## 2019-10-05 RX ADMIN — MONTELUKAST 10 MILLIGRAM(S): 4 TABLET, CHEWABLE ORAL at 12:16

## 2019-10-05 RX ADMIN — PANTOPRAZOLE SODIUM 40 MILLIGRAM(S): 20 TABLET, DELAYED RELEASE ORAL at 06:09

## 2019-10-05 RX ADMIN — Medication 2: at 12:37

## 2019-10-05 RX ADMIN — Medication 4 UNIT(S): at 08:38

## 2019-10-05 RX ADMIN — Medication 200 MILLIGRAM(S): at 06:09

## 2019-10-05 RX ADMIN — BUDESONIDE AND FORMOTEROL FUMARATE DIHYDRATE 2 PUFF(S): 160; 4.5 AEROSOL RESPIRATORY (INHALATION) at 08:39

## 2019-10-05 RX ADMIN — Medication 100 MILLIGRAM(S): at 06:09

## 2019-10-05 RX ADMIN — Medication 4 UNIT(S): at 12:38

## 2019-10-05 RX ADMIN — Medication 100 MILLIGRAM(S): at 08:39

## 2019-10-05 NOTE — PROGRESS NOTE ADULT - SUBJECTIVE AND OBJECTIVE BOX
Infectious Diseases progress note:    Subjective: Nad, afebrile.  No melena, diarrhea improving, no abd pain    ROS:  CONSTITUTIONAL:  No fever, chills, rigors  CARDIOVASCULAR:  No chest pain or palpitations  RESPIRATORY:   No SOB, cough, dyspnea on exertion.  No wheezing  GASTROINTESTINAL:  No abd pain, N/V, diarrhea/constipation  EXTREMITIES:  No swelling or joint pain  GENITOURINARY:  No burning on urination, increased frequency or urgency.  No flank pain  NEUROLOGIC:  No HA, visual disturbances  SKIN: No rashes    Allergies    No Known Allergies    Intolerances        ANTIBIOTICS/RELEVANT:  antimicrobials  ciprofloxacin   IVPB 400 milliGRAM(s) IV Intermittent every 12 hours  metroNIDAZOLE  IVPB      metroNIDAZOLE  IVPB 500 milliGRAM(s) IV Intermittent every 8 hours    immunologic:  influenza   Vaccine 0.5 milliLiter(s) IntraMuscular once    OTHER:  ALBUTerol    90 MICROgram(s) HFA Inhaler 2 Puff(s) Inhalation every 6 hours PRN  atorvastatin 20 milliGRAM(s) Oral at bedtime  buDESOnide  80 MICROgram(s)/formoterol 4.5 MICROgram(s) Inhaler 2 Puff(s) Inhalation two times a day  dextrose 40% Gel 15 Gram(s) Oral once PRN  dextrose 5%. 1000 milliLiter(s) IV Continuous <Continuous>  dextrose 50% Injectable 12.5 Gram(s) IV Push once  dextrose 50% Injectable 25 Gram(s) IV Push once  dextrose 50% Injectable 25 Gram(s) IV Push once  furosemide    Tablet 20 milliGRAM(s) Oral <User Schedule>  glucagon  Injectable 1 milliGRAM(s) IntraMuscular once PRN  insulin glargine Injectable (LANTUS) 6 Unit(s) SubCutaneous at bedtime  insulin lispro (HumaLOG) corrective regimen sliding scale   SubCutaneous three times a day before meals  insulin lispro Injectable (HumaLOG) 4 Unit(s) SubCutaneous three times a day before meals  latanoprost 0.005% Ophthalmic Solution 1 Drop(s) Both EYES at bedtime  metoprolol succinate  milliGRAM(s) Oral daily  montelukast 10 milliGRAM(s) Oral daily  morphine  - Injectable 2 milliGRAM(s) IV Push Once  pantoprazole  Injectable 40 milliGRAM(s) IV Push two times a day      Objective:  Vital Signs Last 24 Hrs  T(C): 36.8 (05 Oct 2019 15:06), Max: 36.8 (04 Oct 2019 20:56)  T(F): 98.2 (05 Oct 2019 15:06), Max: 98.3 (04 Oct 2019 20:56)  HR: 74 (05 Oct 2019 15:06) (64 - 76)  BP: 136/76 (05 Oct 2019 15:06) (124/68 - 142/75)  BP(mean): --  RR: 18 (05 Oct 2019 15:06) (18 - 19)  SpO2: 99% (05 Oct 2019 15:06) (97% - 99%)    PHYSICAL EXAM:  Constitutional:NAD  Eyes:ZACHARY, EOMI  Ear/Nose/Throat: no thrush, mucositis.  Moist mucous membranes	  Neck:no JVD, no lymphadenopathy, supple  Respiratory: CTA benitez  Cardiovascular: S1S2 RRR, no murmurs  Gastrointestinal:soft, nontender,  nondistended (+) BS  Extremities:no e/e/c  Skin:  no rashes, open wounds or ulcerations        LABS:                        12.2   7.18  )-----------( 197      ( 05 Oct 2019 05:50 )             38.2     10-05    141  |  109<H>  |  14  ----------------------------<  141<H>  3.7   |  22  |  1.01    Ca    9.5      05 Oct 2019 05:50  Phos  2.6     10-05  Mg     1.7     10-05              MICROBIOLOGY:    Culture - Stool (10.03.19 @ 21:14)    Culture - Stool:   ****************** PRELIMINARY **************************  NEGATIVE FOR SALMONELLA, SHIGELLA, YERSINIA,  E. COLI O157, AEROMONAS, PLESIOMONAS, AND VIBRIO SP.                      AFTER 24 HOURS  *********************************************************    Specimen Source: FECES            RADIOLOGY & ADDITIONAL STUDIES:    < from: Xray Chest 1 View- PORTABLE-Urgent (10.04.19 @ 19:37) >  FINDINGS:     Cardiac silhouette not well evaluated. Clear lungs. No pleural effusion   or pneumothorax.    IMPRESSION:   Clear lungs.    < end of copied text >  < from: Xray Chest 1 View- PORTABLE-Urgent (10.04.19 @ 19:37) >  FINDINGS:     Cardiac silhouette not well evaluated. Clear lungs. No pleural effusion   or pneumothorax.    IMPRESSION:   Clear lungs.    < end of copied text >        < from: CT Abdomen and Pelvis w/ IV Cont (10.02.19 @ 03:34) >    IMPRESSION:     Long segment thickening of the colon centered on the splenic flexure   extending from the mid transverse colon to the mid descending colon due   to infectious or inflammatory colitis. Due to the watershed location   however, ischemic colitis should be considered.    < end of copied text >

## 2019-10-05 NOTE — PROGRESS NOTE ADULT - PROBLEM SELECTOR PLAN 2
no chest x-ray on admission: ordered one: She has not been wheezing: cont Symbicort
no chest x-ray on admission: ordered one: She has not been wheezing: cont Symbicort  10/4: able to tolerate endoscopy yesterday
- ISS while inpatient
- ISS while inpatient
Continue meds, primary team FU
no chest x-ray on admission: ordered one: She has not been wheezing: cont Symbicort  10/4: able to tolerate endoscopy yesterday  10/5 stable. no wheezing. continue Albuterol, Symbicort, Singulair

## 2019-10-05 NOTE — PROGRESS NOTE ADULT - SUBJECTIVE AND OBJECTIVE BOX
Chief complaint  Patient is a 69y old  Female who presents with a chief complaint of abdominal pain (05 Oct 2019 06:43)   Review of systems  Patient in bed, looks comfortable, no fever, no hypoglycemia.    Labs and Fingersticks  CAPILLARY BLOOD GLUCOSE      POCT Blood Glucose.: 186 mg/dL (04 Oct 2019 21:47)  POCT Blood Glucose.: 151 mg/dL (04 Oct 2019 17:40)  POCT Blood Glucose.: 203 mg/dL (04 Oct 2019 12:02)      Anion Gap, Serum: 10 (10-05 @ 05:50)  Anion Gap, Serum: 12 (10-04 @ 05:12)      Calcium, Total Serum: 9.5 (10-05 @ 05:50)  Calcium, Total Serum: 9.2 (10-04 @ 05:12)          10-05    141  |  109<H>  |  14  ----------------------------<  141<H>  3.7   |  22  |  1.01    Ca    9.5      05 Oct 2019 05:50  Phos  2.6     10-05  Mg     1.7     10-05                          12.2   7.18  )-----------( 197      ( 05 Oct 2019 05:50 )             38.2     Medications  MEDICATIONS  (STANDING):  atorvastatin 20 milliGRAM(s) Oral at bedtime  buDESOnide  80 MICROgram(s)/formoterol 4.5 MICROgram(s) Inhaler 2 Puff(s) Inhalation two times a day  ciprofloxacin   IVPB 400 milliGRAM(s) IV Intermittent every 12 hours  dextrose 5%. 1000 milliLiter(s) (50 mL/Hr) IV Continuous <Continuous>  dextrose 50% Injectable 12.5 Gram(s) IV Push once  dextrose 50% Injectable 25 Gram(s) IV Push once  dextrose 50% Injectable 25 Gram(s) IV Push once  furosemide    Tablet 20 milliGRAM(s) Oral <User Schedule>  influenza   Vaccine 0.5 milliLiter(s) IntraMuscular once  insulin glargine Injectable (LANTUS) 6 Unit(s) SubCutaneous at bedtime  insulin lispro (HumaLOG) corrective regimen sliding scale   SubCutaneous three times a day before meals  insulin lispro Injectable (HumaLOG) 4 Unit(s) SubCutaneous three times a day before meals  latanoprost 0.005% Ophthalmic Solution 1 Drop(s) Both EYES at bedtime  metoprolol succinate  milliGRAM(s) Oral daily  metroNIDAZOLE  IVPB      metroNIDAZOLE  IVPB 500 milliGRAM(s) IV Intermittent every 8 hours  montelukast 10 milliGRAM(s) Oral daily  morphine  - Injectable 2 milliGRAM(s) IV Push Once  pantoprazole  Injectable 40 milliGRAM(s) IV Push two times a day      Physical Exam  General: Patient comfortable in bed  Vital Signs Last 12 Hrs  T(F): 98 (10-05-19 @ 06:07), Max: 98.3 (10-04-19 @ 20:56)  HR: 76 (10-05-19 @ 06:07) (64 - 76)  BP: 142/75 (10-05-19 @ 06:07) (124/68 - 142/75)  BP(mean): --  RR: 19 (10-05-19 @ 06:07) (18 - 19)  SpO2: 99% (10-05-19 @ 06:07) (98% - 99%)  Neck: No palpable thyroid nodules.  CVS: S1S2, No murmurs  Respiratory: No wheezing, no crepitations  GI: Abdomen soft, bowel sounds positive  Musculoskeletal:  edema lower extremities.   Skin: No skin rashes, no ecchymosis    Diagnostics    Hemoglobin A1C, Whole Blood: Routine (10-02 @ 17:19)  Thyroid Stimulating Hormone, Serum: AM Sched. Collection: 06-Oct-2019 06:00 (10-05 @ 07:48)  Free Thyroxine, Serum: AM Sched. Collection: 06-Oct-2019 06:00 (10-05 @ 07:48)

## 2019-10-05 NOTE — PROGRESS NOTE ADULT - PROBLEM SELECTOR PROBLEM 1
Colitis
DM (diabetes mellitus screen)

## 2019-10-05 NOTE — PROGRESS NOTE ADULT - SUBJECTIVE AND OBJECTIVE BOX
INTERVAL HPI/OVERNIGHT EVENTS:    feeling better  pain nearly resolved   less diarrhea, now soft, nonbloody  no n/v   tolerating po     MEDICATIONS  (STANDING):  atorvastatin 20 milliGRAM(s) Oral at bedtime  buDESOnide  80 MICROgram(s)/formoterol 4.5 MICROgram(s) Inhaler 2 Puff(s) Inhalation two times a day  ciprofloxacin   IVPB 400 milliGRAM(s) IV Intermittent every 12 hours  dextrose 5%. 1000 milliLiter(s) (50 mL/Hr) IV Continuous <Continuous>  dextrose 50% Injectable 12.5 Gram(s) IV Push once  dextrose 50% Injectable 25 Gram(s) IV Push once  dextrose 50% Injectable 25 Gram(s) IV Push once  furosemide    Tablet 20 milliGRAM(s) Oral <User Schedule>  influenza   Vaccine 0.5 milliLiter(s) IntraMuscular once  insulin glargine Injectable (LANTUS) 6 Unit(s) SubCutaneous at bedtime  insulin lispro (HumaLOG) corrective regimen sliding scale   SubCutaneous three times a day before meals  insulin lispro Injectable (HumaLOG) 4 Unit(s) SubCutaneous three times a day before meals  latanoprost 0.005% Ophthalmic Solution 1 Drop(s) Both EYES at bedtime  metoprolol succinate  milliGRAM(s) Oral daily  metroNIDAZOLE  IVPB      metroNIDAZOLE  IVPB 500 milliGRAM(s) IV Intermittent every 8 hours  montelukast 10 milliGRAM(s) Oral daily  morphine  - Injectable 2 milliGRAM(s) IV Push Once  pantoprazole  Injectable 40 milliGRAM(s) IV Push two times a day    MEDICATIONS  (PRN):  ALBUTerol    90 MICROgram(s) HFA Inhaler 2 Puff(s) Inhalation every 6 hours PRN Bronchospasm  dextrose 40% Gel 15 Gram(s) Oral once PRN Blood Glucose LESS THAN 70 milliGRAM(s)/deciliter  glucagon  Injectable 1 milliGRAM(s) IntraMuscular once PRN Glucose LESS THAN 70 milligrams/deciliter      Allergies    No Known Allergies    Intolerances        Review of Systems:    General:  No wt loss, fevers, chills, night sweats, fatigue   Eyes:  Good vision, no reported pain  ENT:  No sore throat, pain, runny nose, dysphagia  CV:  No pain, palpitations, hypo/hypertension  Resp:  No dyspnea, cough, tachypnea, wheezing  GI:  No pain, No nausea, No vomiting, No diarrhea, No constipation, No weight loss, No fever, No pruritis, No rectal bleeding, No melena, No dysphagia  :  No pain, bleeding, incontinence, nocturia  Muscle:  No pain, weakness  Neuro:  No weakness, tingling, memory problems  Psych:  No fatigue, insomnia, mood problems, depression  Endocrine:  No polyuria, polydypsia, cold/heat intolerance  Heme:  No petechiae, ecchymosis, easy bruisability  Skin:  No rash, tattoos, scars, edema      Vital Signs Last 24 Hrs  T(C): 36.7 (05 Oct 2019 06:07), Max: 36.8 (04 Oct 2019 20:56)  T(F): 98 (05 Oct 2019 06:07), Max: 98.3 (04 Oct 2019 20:56)  HR: 69 (05 Oct 2019 09:19) (64 - 82)  BP: 142/75 (05 Oct 2019 06:07) (121/70 - 142/75)  BP(mean): --  RR: 19 (05 Oct 2019 06:07) (18 - 20)  SpO2: 97% (05 Oct 2019 09:19) (97% - 100%)    PHYSICAL EXAM:    Constitutional: NAD  HEENT: EOMI, throat clear  Neck: No LAD, supple  Respiratory: CTA and P  Cardiovascular: S1 and S2, RRR, no M  Gastrointestinal: BS+, soft, NT/ND, neg HSM,  Extremities: No peripheral edema, neg clubbing, cyanosis  Vascular: 2+ peripheral pulses  Neurological: A/O x 3, no focal deficits  Psychiatric: Normal mood, normal affect  Skin: No rashes      LABS:                        12.2   7.18  )-----------( 197      ( 05 Oct 2019 05:50 )             38.2     10-05    141  |  109<H>  |  14  ----------------------------<  141<H>  3.7   |  22  |  1.01    Ca    9.5      05 Oct 2019 05:50  Phos  2.6     10-05  Mg     1.7     10-05            RADIOLOGY & ADDITIONAL TESTS: INTERVAL HPI/OVERNIGHT EVENTS:    feeling better  pain nearly resolved   nonbloody watery bm this morning x 1   no n/v   tolerating po     MEDICATIONS  (STANDING):  atorvastatin 20 milliGRAM(s) Oral at bedtime  buDESOnide  80 MICROgram(s)/formoterol 4.5 MICROgram(s) Inhaler 2 Puff(s) Inhalation two times a day  ciprofloxacin   IVPB 400 milliGRAM(s) IV Intermittent every 12 hours  dextrose 5%. 1000 milliLiter(s) (50 mL/Hr) IV Continuous <Continuous>  dextrose 50% Injectable 12.5 Gram(s) IV Push once  dextrose 50% Injectable 25 Gram(s) IV Push once  dextrose 50% Injectable 25 Gram(s) IV Push once  furosemide    Tablet 20 milliGRAM(s) Oral <User Schedule>  influenza   Vaccine 0.5 milliLiter(s) IntraMuscular once  insulin glargine Injectable (LANTUS) 6 Unit(s) SubCutaneous at bedtime  insulin lispro (HumaLOG) corrective regimen sliding scale   SubCutaneous three times a day before meals  insulin lispro Injectable (HumaLOG) 4 Unit(s) SubCutaneous three times a day before meals  latanoprost 0.005% Ophthalmic Solution 1 Drop(s) Both EYES at bedtime  metoprolol succinate  milliGRAM(s) Oral daily  metroNIDAZOLE  IVPB      metroNIDAZOLE  IVPB 500 milliGRAM(s) IV Intermittent every 8 hours  montelukast 10 milliGRAM(s) Oral daily  morphine  - Injectable 2 milliGRAM(s) IV Push Once  pantoprazole  Injectable 40 milliGRAM(s) IV Push two times a day    MEDICATIONS  (PRN):  ALBUTerol    90 MICROgram(s) HFA Inhaler 2 Puff(s) Inhalation every 6 hours PRN Bronchospasm  dextrose 40% Gel 15 Gram(s) Oral once PRN Blood Glucose LESS THAN 70 milliGRAM(s)/deciliter  glucagon  Injectable 1 milliGRAM(s) IntraMuscular once PRN Glucose LESS THAN 70 milligrams/deciliter      Allergies    No Known Allergies    Intolerances        Review of Systems:    General:  No wt loss, fevers, chills, night sweats, fatigue   Eyes:  Good vision, no reported pain  ENT:  No sore throat, pain, runny nose, dysphagia  CV:  No pain, palpitations, hypo/hypertension  Resp:  No dyspnea, cough, tachypnea, wheezing  GI:  No pain, No nausea, No vomiting, +diarrhea, No constipation, No weight loss, No fever, No pruritis, No rectal bleeding, No melena, No dysphagia  :  No pain, bleeding, incontinence, nocturia  Muscle:  No pain, weakness  Neuro:  No weakness, tingling, memory problems  Psych:  No fatigue, insomnia, mood problems, depression  Endocrine:  No polyuria, polydypsia, cold/heat intolerance  Heme:  No petechiae, ecchymosis, easy bruisability  Skin:  No rash, tattoos, scars, edema      Vital Signs Last 24 Hrs  T(C): 36.7 (05 Oct 2019 06:07), Max: 36.8 (04 Oct 2019 20:56)  T(F): 98 (05 Oct 2019 06:07), Max: 98.3 (04 Oct 2019 20:56)  HR: 69 (05 Oct 2019 09:19) (64 - 82)  BP: 142/75 (05 Oct 2019 06:07) (121/70 - 142/75)  BP(mean): --  RR: 19 (05 Oct 2019 06:07) (18 - 20)  SpO2: 97% (05 Oct 2019 09:19) (97% - 100%)    PHYSICAL EXAM:    Constitutional: NAD  HEENT: EOMI, throat clear  Neck: No LAD, supple  Respiratory: CTA and P  Cardiovascular: S1 and S2, RRR, no M  Gastrointestinal: BS+, soft, NT/ND, neg HSM,  Extremities: No peripheral edema, neg clubbing, cyanosis  Vascular: 2+ peripheral pulses  Neurological: A/O x 3, no focal deficits  Psychiatric: Normal mood, normal affect  Skin: No rashes      LABS:                        12.2   7.18  )-----------( 197      ( 05 Oct 2019 05:50 )             38.2     10-05    141  |  109<H>  |  14  ----------------------------<  141<H>  3.7   |  22  |  1.01    Ca    9.5      05 Oct 2019 05:50  Phos  2.6     10-05  Mg     1.7     10-05            RADIOLOGY & ADDITIONAL TESTS:

## 2019-10-05 NOTE — PROGRESS NOTE ADULT - PROVIDER SPECIALTY LIST ADULT
Cardiology
Cardiology
Endocrinology
Gastroenterology
Gastroenterology
Hospitalist
Hospitalist
Infectious Disease
Infectious Disease
Pulmonology
Cardiology
Pulmonology
Pulmonology

## 2019-10-05 NOTE — DISCHARGE NOTE PROVIDER - CARE PROVIDERS DIRECT ADDRESSES
,[email or web address],DirectAddress_Unknown

## 2019-10-05 NOTE — PROGRESS NOTE ADULT - ASSESSMENT
69yF w/pmhx DM, HTN, HLD, asthma, GERD p/w abdominal pain and bloody bowel movements. Pt states last night she developed nausea, vomiting, diarrhea and lower abdominal pain. Pt states she has had multiple episodes of diarrhea with the most recent two being only blood, she states it is dark red in color without feces mixed in. She states she has had 3 total episodes of emesis and last episode tonight looked the same as her bloody BM. Pt states she has lower abdominal pain, crampy in nature. Associated she has dizziness/lightheadedness. Reports normal colonoscopy 3-4 years ago. Pt denies fever/chills, sick contacts, recent travel, back pain, chest pain, shortness of breath, palpitations, dysuria, urinary frequency or any other concerns.
69yF w/pmhx DM, HTN, HLD, asthma, GERD p/w abdominal pain and bloody bowel movements. Pt states last night she developed nausea, vomiting, diarrhea and lower abdominal pain. Pt states she has had multiple episodes of diarrhea with the most recent two being only blood, she states it is dark red in color without feces mixed in. She states she has had 3 total episodes of emesis and last episode tonight looked the same as her bloody BM. Pt states she has lower abdominal pain, crampy in nature. Associated she has dizziness/lightheadedness. Reports normal colonoscopy 3-4 years ago. Pt denies fever/chills, sick contacts, recent travel, back pain, chest pain, shortness of breath, palpitations, dysuria, urinary frequency or any other concerns.
HTN  stable  cont current meds    DM  Monitor finger stick. Insulin coverage. Diabetic education and Diabetic diet. Consider nutrition consultation.    Abd pain   plan for EGD/Colonoscopy   fu with GI     PreOP   Based on current ACC/AHA guidelines, patient history and physical exam, the patient is considered to have low risk  pt had recent cath april of 2019 for palpitation and by self report it was unremarkable   no absolute CV objection to proceed to planned procedure
69yF w/pmhx DM, HTN, HLD, asthma, GERD p/w abdominal pain and bloody bowel movements with CT Abd notable for colitis
69yF w/pmhx DM, HTN, HLD, asthma, GERD p/w abdominal pain and bloody bowel movements with CT Abd notable for colitis
69yF w/pmhx DM, HTN, HLD, asthma, GERD p/w abdominal pain and bloody bowel movements. Pt states last night she developed nausea, vomiting, diarrhea and lower abdominal pain. Pt states she has had multiple episodes of diarrhea with the most recent two being only blood, she states it is dark red in color without feces mixed in. She states she has had 3 total episodes of emesis and last episode tonight looked the same as her bloody BM. Pt states she has lower abdominal pain, crampy in nature. Associated she has dizziness/lightheadedness. Reports normal colonoscopy 3-4 years ago. Pt denies fever/chills, sick contacts, recent travel, back pain, chest pain, shortness of breath, palpitations, dysuria, urinary frequency or any other concerns.
69yF w/pmhx DM, HTN, HLD, asthma, GERD p/w abdominal pain and bloody bowel movements. Pt states last night she developed nausea, vomiting, diarrhea and lower abdominal pain. Pt states she has had multiple episodes of diarrhea with the most recent two being only blood, she states it is dark red in color without feces mixed in. She states she has had 3 total episodes of emesis and last episode tonight looked the same as her bloody BM. Pt states she has lower abdominal pain, crampy in nature. Associated she has dizziness/lightheadedness. Reports normal colonoscopy 3-4 years ago. Pt denies fever/chills, sick contacts, recent travel, back pain, chest pain, shortness of breath, palpitations, dysuria, urinary frequency or any other concerns.    Problem/Plan - 1:  ·  Problem: Colitis.  Plan: GI fu  antibiotics as per ID   monitor cbc  stool studies  will monitor.     Problem/Plan - 2:  ·  Problem: Asthma.  Plan: cw home meds.     Problem/Plan - 3:  ·  Problem: Hypertension, unspecified type.  Plan: cw home meds.     Problem/Plan - 4:  ·  Problem: DM (diabetes mellitus screen).  Plan: hold oral meds  ISS.
69yF w/pmhx DM, HTN, HLD, asthma, GERD p/w abdominal pain and bloody bowel movements. Pt states last night she developed nausea, vomiting, diarrhea and lower abdominal pain. Pt states she has had multiple episodes of diarrhea with the most recent two being only blood, she states it is dark red in color without feces mixed in. She states she has had 3 total episodes of emesis and last episode tonight looked the same as her bloody BM. Pt states she has lower abdominal pain, crampy in nature. Associated she has dizziness/lightheadedness. Reports normal colonoscopy 3-4 years ago. Pt denies fever/chills, sick contacts, recent travel, back pain, chest pain, shortness of breath, palpitations, dysuria, urinary frequency or any other concerns. (02 Oct 2019 11:29)    WBC 11.7 --> 8.8.  Afebrile, normotensive.  CTap shows long segment thickening of the colon  centered on the splenic flexure extending from the mid transverse colon to the mid descending colon due to infectious or inflammatory colitis.  Pt initially given zosyn, now on cipro/flagyl.   Pt for endoscopy.   ID consult called for further abx recommendations    r/o Infectious vs. ischemic colitis:    - Pt without fever, no recent sick contacts, no dietary changes, no recent abx exposure.  (+) GIB.  Pt with abd pain, hematemesis and rectal bleeding.  Monitor H/H and transfuse PRN    - Cont cipro/flagyl on empiric basis.  Check GI pcr, Stool O&P, stool cultures neg,    - P CT (+) left sided colitis.  EGD (+) shows chronic gastritis, biopsies taken.  Colonoscopy planned as outpt per GI    - Monitor stool output, serial abd exams.  No rebound or guarding    - Monitor WBC and temp curve.  If pt spikes fever > 100.4, send bcx x 2    - Complete 10 day course of cipro/flagyl.  Can change to PO upon discharge.       Will follow,    Shahnaz Mejia  [phone]
69yF w/pmhx DM, HTN, HLD, asthma, GERD p/w abdominal pain and bloody bowel movements. Pt states last night she developed nausea, vomiting, diarrhea and lower abdominal pain. Pt states she has had multiple episodes of diarrhea with the most recent two being only blood, she states it is dark red in color without feces mixed in. She states she has had 3 total episodes of emesis and last episode tonight looked the same as her bloody BM. Pt states she has lower abdominal pain, crampy in nature. Associated she has dizziness/lightheadedness. Reports normal colonoscopy 3-4 years ago. Pt denies fever/chills, sick contacts, recent travel, back pain, chest pain, shortness of breath, palpitations, dysuria, urinary frequency or any other concerns. (02 Oct 2019 11:29)    WBC 11.7 --> 8.8.  Afebrile, normotensive.  CTap shows long segment thickening of the colon  centered on the splenic flexure extending from the mid transverse colon to the mid descending colon due to infectious or inflammatory colitis.  Pt initially given zosyn, now on cipro/flagyl.   Pt for endoscopy.   ID consult called for further abx recommendations    r/o Infectious vs. ischemic colitis:    - Pt without fever, no recent sick contacts, no dietary changes, no recent abx exposure.  (+) GIB.  Pt with abd pain, hematemesis and rectal bleeding.  Monitor H/H and transfuse PRN    - Cont cipro/flagyl on empiric basis.  Check GI pcr, Stool O&P, stool cultures neg,    - P CT (+) left sided colitis.  EGD (+) shows chronic gastritis, biopsies taken.  Colonoscopy planned as outpt per GI    - Monitor stool output, serial abd exams.  No rebound or guarding    - Monitor WBC and temp curve.  If pt spikes fever > 100.4, send bcx x 2    - Complete 10 day course of cipro/flagyl.  Can change to PO upon discharge.     d/c planning      Will follow,    Shahnaz Mejia  [phone]
Assessment  DMT2: 69y Female with DM T2 with hyperglycemia, was on oral meds and insulin at home, on basal bolus insulin, FS improving, no hypoglycemic episode, eating meals,  non compliant with low carb diet.  Gastroenteritis: on medications, stable, monitored.  HTN: Controlled,  on antihypertensive medications.  Obesity: No strict exercise routines, not on any weight loss program, neither on low calorie diet.          Ramno Crooks MD  Cell: [phone]  Office: [phone]
Assessment  DMT2: 69y Female with DM T2 with hyperglycemia, was on oral meds and insulin at home, on basal bolus insulin, FS improving, no hypoglycemic episode, eating meals, had weaness and sob this morning, sugars > 100, non compliant with low carb diet.  Gastroenteritis: on medications, stable, monitored.  HTN: Controlled,  on antihypertensive medications.  Obesity: No strict exercise routines, not on any weight loss program, neither on low calorie diet.          Ramon Crooks MD  Cell: [phone]  Office: [phone]
Assessment  DMT2: 69y Female with DM T2 with hyperglycemia, was on oral meds and insulin at home, on basal bolus insulin, no FS this morning, awake and alert, FS improving, no hypoglycemic episode, eating meals, non compliant with low carb diet.  Gastroenteritis: on medications, stable, monitored.  HTN: Controlled,  on antihypertensive medications.  Obesity: No strict exercise routines, not on any weight loss program, neither on low calorie diet.          Ramon Crooks MD  Cell: [phone]  Office: [phone]
HTN  stable  cont current meds    DM  Monitor finger stick. Insulin coverage. Diabetic education and Diabetic diet. Consider nutrition consultation.    HA  if persistent , consider head CT
HTN  stable  cont current meds    DM  Monitor finger stick. Insulin coverage. Diabetic education and Diabetic diet. Consider nutrition consultation.    colitis  on cipro/flagyl  fu with GI
Problem/Plan - 1:  ·  Problem: Colitis.  Plan: GI fu  antibiotics as per ID   monitor cbc  stool studies  will monitor.     Problem/Plan - 2:  ·  Problem: Asthma.  Plan: cw home meds.     Problem/Plan - 3:  ·  Problem: Hypertension, unspecified type.  Plan: cw home meds.     Problem/Plan - 4:  ·  Problem: DM (diabetes mellitus screen).  Plan: hold oral meds  ISS.

## 2019-10-05 NOTE — PROGRESS NOTE ADULT - SUBJECTIVE AND OBJECTIVE BOX
Subjective: Patient seen and examined. No new events except as noted.     SUBJECTIVE/ROS:    feels better  still with  diarrhea     MEDICATIONS:  MEDICATIONS  (STANDING):  atorvastatin 20 milliGRAM(s) Oral at bedtime  buDESOnide  80 MICROgram(s)/formoterol 4.5 MICROgram(s) Inhaler 2 Puff(s) Inhalation two times a day  ciprofloxacin   IVPB 400 milliGRAM(s) IV Intermittent every 12 hours  dextrose 5%. 1000 milliLiter(s) (50 mL/Hr) IV Continuous <Continuous>  dextrose 50% Injectable 12.5 Gram(s) IV Push once  dextrose 50% Injectable 25 Gram(s) IV Push once  dextrose 50% Injectable 25 Gram(s) IV Push once  furosemide    Tablet 20 milliGRAM(s) Oral <User Schedule>  influenza   Vaccine 0.5 milliLiter(s) IntraMuscular once  insulin glargine Injectable (LANTUS) 6 Unit(s) SubCutaneous at bedtime  insulin lispro (HumaLOG) corrective regimen sliding scale   SubCutaneous three times a day before meals  insulin lispro Injectable (HumaLOG) 4 Unit(s) SubCutaneous three times a day before meals  latanoprost 0.005% Ophthalmic Solution 1 Drop(s) Both EYES at bedtime  metoprolol succinate  milliGRAM(s) Oral daily  metroNIDAZOLE  IVPB      metroNIDAZOLE  IVPB 500 milliGRAM(s) IV Intermittent every 8 hours  montelukast 10 milliGRAM(s) Oral daily  morphine  - Injectable 2 milliGRAM(s) IV Push Once  pantoprazole  Injectable 40 milliGRAM(s) IV Push two times a day      PHYSICAL EXAM:  T(C): 36.7 (10-05-19 @ 06:07), Max: 36.8 (10-04-19 @ 20:56)  HR: 76 (10-05-19 @ 06:07) (64 - 82)  BP: 142/75 (10-05-19 @ 06:07) (121/70 - 142/75)  RR: 19 (10-05-19 @ 06:07) (18 - 20)  SpO2: 99% (10-05-19 @ 06:07) (98% - 100%)  Wt(kg): --  I&O's Summary    03 Oct 2019 07:01  -  04 Oct 2019 07:00  --------------------------------------------------------  IN: 2675 mL / OUT: 300 mL / NET: 2375 mL            JVP: Normal  Neck: supple  Lung: clear   CV: S1 S2 , Murmur:  Abd: soft  Ext: No edema  neuro: Awake / alert  Psych: flat affect  Skin: normal``    LABS/DATA:    CARDIAC MARKERS:  CARDIAC MARKERS ( 04 Oct 2019 18:05 )  x     / x     / 128 u/L / 1.82 ng/mL / x                                    11.5   7.47  )-----------( 194      ( 04 Oct 2019 05:12 )             35.3     10-04    143  |  109<H>  |  13  ----------------------------<  96  3.3<L>   |  22  |  1.08    Ca    9.2      04 Oct 2019 05:12  Phos  3.2     10-04  Mg     1.7     10-04      proBNP:   Lipid Profile:   HgA1c:   TSH:     TELE:  EKG:

## 2019-10-05 NOTE — PROGRESS NOTE ADULT - SUBJECTIVE AND OBJECTIVE BOX
Patient is a 69y old  Female who presents with a chief complaint of abdominal pain (05 Oct 2019 08:12)  Any change in ROS: doing ok. no sob, no cough, no sputum. no chest pain. on room air     MEDICATIONS  (STANDING):  atorvastatin 20 milliGRAM(s) Oral at bedtime  buDESOnide  80 MICROgram(s)/formoterol 4.5 MICROgram(s) Inhaler 2 Puff(s) Inhalation two times a day  ciprofloxacin   IVPB 400 milliGRAM(s) IV Intermittent every 12 hours  dextrose 5%. 1000 milliLiter(s) (50 mL/Hr) IV Continuous <Continuous>  dextrose 50% Injectable 12.5 Gram(s) IV Push once  dextrose 50% Injectable 25 Gram(s) IV Push once  dextrose 50% Injectable 25 Gram(s) IV Push once  furosemide    Tablet 20 milliGRAM(s) Oral <User Schedule>  influenza   Vaccine 0.5 milliLiter(s) IntraMuscular once  insulin glargine Injectable (LANTUS) 6 Unit(s) SubCutaneous at bedtime  insulin lispro (HumaLOG) corrective regimen sliding scale   SubCutaneous three times a day before meals  insulin lispro Injectable (HumaLOG) 4 Unit(s) SubCutaneous three times a day before meals  latanoprost 0.005% Ophthalmic Solution 1 Drop(s) Both EYES at bedtime  metoprolol succinate  milliGRAM(s) Oral daily  metroNIDAZOLE  IVPB      metroNIDAZOLE  IVPB 500 milliGRAM(s) IV Intermittent every 8 hours  montelukast 10 milliGRAM(s) Oral daily  morphine  - Injectable 2 milliGRAM(s) IV Push Once  pantoprazole  Injectable 40 milliGRAM(s) IV Push two times a day    MEDICATIONS  (PRN):  ALBUTerol    90 MICROgram(s) HFA Inhaler 2 Puff(s) Inhalation every 6 hours PRN Bronchospasm  dextrose 40% Gel 15 Gram(s) Oral once PRN Blood Glucose LESS THAN 70 milliGRAM(s)/deciliter  glucagon  Injectable 1 milliGRAM(s) IntraMuscular once PRN Glucose LESS THAN 70 milligrams/deciliter    Vital Signs Last 24 Hrs  T(C): 36.7 (05 Oct 2019 06:07), Max: 36.8 (04 Oct 2019 20:56)  T(F): 98 (05 Oct 2019 06:07), Max: 98.3 (04 Oct 2019 20:56)  HR: 69 (05 Oct 2019 09:19) (64 - 82)  BP: 142/75 (05 Oct 2019 06:07) (121/70 - 142/75)  BP(mean): --  RR: 19 (05 Oct 2019 06:07) (18 - 20)  SpO2: 97% (05 Oct 2019 09:19) (97% - 100%)    I&O's Summary        Physical Exam:   GENERAL: The patient comfortable with no apparent distress.   HEENT: Head is normocephalic and atraumatic.    NECK: Supple with no elevated JVP.  LUNGS: Clear to auscultation without wheeze and rhonchi.  HEART: S1 and S2 present without murmur.  ABDOMEN: Soft, nontender, and nondistended.  EXTREMITIES: No edema or calf tenderness.  NEUROLOGIC: Grossly intact.    Labs:  24  CARDIAC MARKERS ( 04 Oct 2019 18:05 )  x     / x     / 128 u/L / 1.82 ng/mL / x                                12.2   7.18  )-----------( 197      ( 05 Oct 2019 05:50 )             38.2                         11.5   7.47  )-----------( 194      ( 04 Oct 2019 05:12 )             35.3                         12.6   8.82  )-----------( 205      ( 03 Oct 2019 05:30 )             38.4                         13.0   10.20 )-----------( 227      ( 02 Oct 2019 07:10 )             40.0                         14.0   11.73 )-----------( 246      ( 02 Oct 2019 03:00 )             43.7     10-05    141  |  109<H>  |  14  ----------------------------<  141<H>  3.7   |  22  |  1.01  10-04    143  |  109<H>  |  13  ----------------------------<  96  3.3<L>   |  22  |  1.08  10-03    145  |  112<H>  |  15  ----------------------------<  100<H>  4.1   |  22  |  1.23  10-02    145  |  108<H>  |  17  ----------------------------<  155<H>  4.7   |  20<L>  |  1.09    Ca    9.5      05 Oct 2019 05:50  Ca    9.2      04 Oct 2019 05:12  Phos  2.6     10-05  Phos  3.2     10-04  Mg     1.7     10-05  Mg     1.7     10-04    TPro  6.9  /  Alb  3.8  /  TBili  0.5  /  DBili  x   /  AST  14  /  ALT  11  /  AlkPhos  78  10-03  TPro  8.0  /  Alb  4.3  /  TBili  0.4  /  DBili  x   /  AST  24  /  ALT  15  /  AlkPhos  92  10-02    CAPILLARY BLOOD GLUCOSE      POCT Blood Glucose.: 148 mg/dL (05 Oct 2019 08:27)  POCT Blood Glucose.: 186 mg/dL (04 Oct 2019 21:47)  POCT Blood Glucose.: 151 mg/dL (04 Oct 2019 17:40)  POCT Blood Glucose.: 203 mg/dL (04 Oct 2019 12:02)    Studies  Chest X-RAY  < from: Xray Chest 2 Views PA/Lat (04.01.15 @ 23:26) >    EXAM:  CHEST PA & LAT                            PROCEDURE DATE:  Apr 1 2015       INTERPRETATION:  Cough    PA and lateral radiographs of the chest demonstrate patchy airspace   disease is noted. The costophrenic angles are clear.      The heart size and vascular markings are unremarkable. No hilar or   mediastinal abnormality is noted.     The osseous structures appear intact.    IMPRESSION: Patchy airspace disease is noted in both lung bases.    < end of copied text >    CT SCAN Chest   n/a   Venous Dopplers: LE: n/a   CT Abdomen  Others  n/a

## 2019-10-05 NOTE — PROGRESS NOTE ADULT - PROBLEM SELECTOR PLAN 4
monitor and control
monitor and control
Overweight/Obesity: Patient counseled for weight loss, exercise, low calorie diet.
monitor and control

## 2019-10-05 NOTE — PROGRESS NOTE ADULT - PROBLEM SELECTOR PLAN 1
Cont antibtiocs: Has colitis on ctc hest  10/4: s/p endoscopy : upper: chronic gastritis:  10/5 s/p EGD on Cipro/Flayl per GI

## 2019-10-05 NOTE — PROGRESS NOTE ADULT - PROBLEM SELECTOR PROBLEM 2
Asthma
Colitis
DM (diabetes mellitus screen)
DM (diabetes mellitus screen)

## 2019-10-05 NOTE — PROGRESS NOTE ADULT - PROBLEM SELECTOR PROBLEM 3
Hypertension, unspecified type
Hypertension, unspecified type
ACP (advance care planning)
ACP (advance care planning)
Hypertension, unspecified type

## 2019-10-05 NOTE — DISCHARGE NOTE PROVIDER - CARE PROVIDER_API CALL
Saeid Oconnor)  Gastroenterology; Internal Medicine  [address] [place] [address]  Phone: [phone]  Fax: [phone]  Follow Up Time: Saeid Oconnor)  Gastroenterology; Internal Medicine  [address] [place] [address]  Phone: [phone]  Fax: [phone]  Follow Up Time:     Ramon Crooks)  EndocrinologyMetabDiabetes; Internal Medicine  [address] [place] [address]  Phone: [phone]  Fax: [phone]  Follow Up Time:

## 2019-10-05 NOTE — PROGRESS NOTE ADULT - PROBLEM SELECTOR PLAN 1
- noted on CT   - complete 10 day total course of cipro/flagyl; change to po on discharge   - fu stool studies   - bacid tid   - pain control prn   - s/p EGD with chronic gastritis; fu pathology   - protonix qd   - low fiber/lactose free diet  - no gi objection to dc planning per primary team - noted on CT; pt still with loose stool; GI PCR pending collection by team  - complete 10 day total course of cipro/flagyl; change to po on discharge   - fu stool studies   - bacid tid   - pain control prn   - s/p EGD with chronic gastritis; fu pathology   - protonix qd   - low fiber/lactose free diet  - no gi objection to dc planning per primary team

## 2019-10-05 NOTE — PROGRESS NOTE ADULT - PROBLEM SELECTOR PLAN 3
Controlled!
Controlled!  10/4: Controlled!
- Advanced care planning was discussed with patient and family.  Advanced care planning forms were reviewed and discussed.  Risks, benefits and alternatives of gastroenterologic procedures were discussed in detail and all questions were answered.    30 minutes spent.
- Advanced care planning was discussed with patient and family.  Advanced care planning forms were reviewed and discussed.  Risks, benefits and alternatives of gastroenterologic procedures were discussed in detail and all questions were answered.    30 minutes spent.
Controlled!  10/4: Controlled!  10/5 stable
Suggest to continue medications, monitoring, FU primary team recommendations. .

## 2019-10-05 NOTE — DISCHARGE NOTE PROVIDER - PROVIDER TOKENS
PROVIDER:[TOKEN:[[id number]]] PROVIDER:[TOKEN:[[id number]]],PROVIDER:[TOKEN:[[id number]]]

## 2019-10-05 NOTE — DISCHARGE NOTE PROVIDER - NSDCCPCAREPLAN_GEN_ALL_CORE_FT
PRINCIPAL DISCHARGE DIAGNOSIS  Diagnosis: Colitis  Assessment and Plan of Treatment: You have been started on intravenous antibiotics for infectious colitis. You have improved clinically and can complete a course of antibiotics as outpatient. Follow up with gastroenterology as outpatient, as well as your PCP, to ensure resolution of infection. Make sure you stay hydrated. PRINCIPAL DISCHARGE DIAGNOSIS  Diagnosis: Colitis  Assessment and Plan of Treatment: You have been started on intravenous antibiotics for infectious colitis. You have improved clinically and can complete a course of antibiotics as outpatient (metronidazole and ciprofloxacin). These medications may make you nauseous and cause diarrhea. While you are still on antibiotics, it is recommended you avoid dairy containing foods until your infection clears. Follow up with gastroenterology as outpatient, as well as your PCP after you finish your antibiotics to ensure resolution of infection. Make sure you stay hydrated. Your endoscopy showed chronic gastritis (stomach inflammation). Continue with omeprazole.      SECONDARY DISCHARGE DIAGNOSES  Diagnosis: Diabetes  Assessment and Plan of Treatment: Your medication regimen has been adjusted as explained. CLOSELY review the changes to your diabetes medication. Continue eating a consistent carbohydrate diet (Meaning eating the same amount of carbohydrates at the same time each day). Monitor blood glucose levels throughout the day before meals and at bedtime. Record blood sugars and bring to outpatient providers appointment in order to be reviewed by your doctor for management modifications. If your sugars are more than 400 or less than 70 you should contact your PCP immediately. Monitor for signs/symptoms of low blood glucose, such as, dizziness, altered mental status, or cool/clammy skin. In addition, monitor for signs/symptoms of high blood glucose, such as, feeling hot, dry, fatigued, or with increased thirst/urination. Make regular podiatry appointments in order to have feet checked for wounds and uncontrolled toe nail growth to prevent infections, as well as, appointments with an ophthalmologist to monitor your vision.   The number to Dr. Crooks (endocrinology) has been provided to you.

## 2019-10-05 NOTE — PROGRESS NOTE ADULT - PROBLEM SELECTOR PROBLEM 4
DM (diabetes mellitus screen)
Obesity

## 2019-10-05 NOTE — DISCHARGE NOTE PROVIDER - HOSPITAL COURSE
70 y/o female with a PMHx of HTN, HLD, DM, asthma, and GERD presented with abdominal pain, hematemesis and bloody bowel movements. Pt was found to be occult positive with stable CBC. CT abdomen and pelvis revealed, "Long segment thickening of the colon centered on the splenic flexure extending from the mid transverse colon to the mid descending colon due to infectious or inflammatory colitis. Due to the watershed location however, ischemic colitis should be considered." Pt was seen by GI and stool cultures were negative. Endoscopy revealed chronic gastritis and otherwise unremarkable findings. Pt was given IV fluids and started on Cipro and Flagyl for infectious colitis. Patient's clinical status improved. Pt should complete a 10 day course of antibiotics as outpatient. Case discussed with Dr. Ruff on 10/5, pt medically stable for discharge home. 70 y/o female with a PMHx of HTN, HLD, DM, asthma, and GERD presented with abdominal pain, hematemesis and bloody bowel movements. Pt was found to be occult positive with stable CBC. CT abdomen and pelvis revealed "Long segment thickening of the colon centered on the splenic flexure extending from the mid transverse colon to the mid descending colon due to infectious or inflammatory colitis. Due to the watershed location however, ischemic colitis should be considered." Pt was seen by GI and stool cultures were negative. Endoscopy revealed chronic gastritis and otherwise unremarkable findings. Pt was given IV fluids and started on Cipro and Flagyl for infectious colitis. Patient's clinical status improved. Pt should complete a 10 day course of antibiotics as outpatient. TTE: Mild diastolic dysfunction (Stage I). No WMA. EF 57%.         Case discussed with . on 10/5/19. The patient is medically stable for discharge and has appropriate follow up. 68 y/o female with a PMHx of HTN, HLD, DM, asthma, and GERD presented with abdominal pain, hematemesis and bloody bowel movements. Pt was found to be occult positive with stable CBC. CT abdomen and pelvis revealed "Long segment thickening of the colon centered on the splenic flexure extending from the mid transverse colon to the mid descending colon due to infectious or inflammatory colitis. Due to the watershed location however, ischemic colitis should be considered." Pt was seen by GI and stool cultures were negative. Endoscopy revealed chronic gastritis and otherwise unremarkable findings. Pt was given IV fluids and started on Cipro and Flagyl for infectious colitis. Patient's clinical status improved. Pt should complete a 10 day course of antibiotics as outpatient. TTE: Mild diastolic dysfunction (Stage I). No WMA. EF 57%.         DM medications reviewed with Dr. Crooks prior to DC. Patient does not have an endocrinologist and it was recommended she follow up with Dr. Crooks after discharge. Changes to medications and antibiotics for discharged reviewed with patient with all questions answered. Medications requiring prescription sent to pharmacy of choice.         Case discussed with Dr. Plaza on 10/5/19. The patient is medically stable for discharge and has appropriate follow up.

## 2019-10-06 LAB
BACTERIA STL CULT: SIGNIFICANT CHANGE UP
O+P SPEC CONC: SIGNIFICANT CHANGE UP
SPECIMEN SOURCE: SIGNIFICANT CHANGE UP
TRI STN SPEC: SIGNIFICANT CHANGE UP

## 2020-12-15 NOTE — ASU PATIENT PROFILE, ADULT - NS PRO TALK SOMEONE YN
Chief Complaint   Patient presents with     New Patient     sob and lung cyst     Vitals were taken and medications were reconciled.     LUCINDA Price      
no

## 2021-01-12 ENCOUNTER — TRANSCRIPTION ENCOUNTER (OUTPATIENT)
Age: 71
End: 2021-01-12

## 2021-04-13 ENCOUNTER — APPOINTMENT (OUTPATIENT)
Dept: DISASTER EMERGENCY | Facility: OTHER | Age: 71
End: 2021-04-13

## 2021-08-09 ENCOUNTER — EMERGENCY (EMERGENCY)
Facility: HOSPITAL | Age: 71
LOS: 1 days | End: 2021-08-09
Attending: EMERGENCY MEDICINE
Payer: COMMERCIAL

## 2021-08-09 VITALS
SYSTOLIC BLOOD PRESSURE: 165 MMHG | DIASTOLIC BLOOD PRESSURE: 92 MMHG | TEMPERATURE: 98 F | RESPIRATION RATE: 19 BRPM | HEIGHT: 64 IN | HEART RATE: 74 BPM | WEIGHT: 227.96 LBS | OXYGEN SATURATION: 98 %

## 2021-08-09 VITALS
OXYGEN SATURATION: 98 % | HEART RATE: 82 BPM | SYSTOLIC BLOOD PRESSURE: 115 MMHG | TEMPERATURE: 98 F | RESPIRATION RATE: 16 BRPM | DIASTOLIC BLOOD PRESSURE: 83 MMHG

## 2021-08-09 DIAGNOSIS — Z98.42 CATARACT EXTRACTION STATUS, LEFT EYE: Chronic | ICD-10-CM

## 2021-08-09 DIAGNOSIS — Z98.890 OTHER SPECIFIED POSTPROCEDURAL STATES: Chronic | ICD-10-CM

## 2021-08-09 DIAGNOSIS — Z98.41 CATARACT EXTRACTION STATUS, RIGHT EYE: Chronic | ICD-10-CM

## 2021-08-09 DIAGNOSIS — M25.9 JOINT DISORDER, UNSPECIFIED: Chronic | ICD-10-CM

## 2021-08-09 DIAGNOSIS — Z90.49 ACQUIRED ABSENCE OF OTHER SPECIFIED PARTS OF DIGESTIVE TRACT: Chronic | ICD-10-CM

## 2021-08-09 DIAGNOSIS — Z98.89 OTHER SPECIFIED POSTPROCEDURAL STATES: Chronic | ICD-10-CM

## 2021-08-09 DIAGNOSIS — Z90.710 ACQUIRED ABSENCE OF BOTH CERVIX AND UTERUS: Chronic | ICD-10-CM

## 2021-08-09 LAB
ALBUMIN SERPL ELPH-MCNC: 4.1 G/DL — SIGNIFICANT CHANGE UP (ref 3.3–5)
ALP SERPL-CCNC: 87 U/L — SIGNIFICANT CHANGE UP (ref 40–120)
ALT FLD-CCNC: 16 U/L — SIGNIFICANT CHANGE UP (ref 10–45)
ANION GAP SERPL CALC-SCNC: 15 MMOL/L — SIGNIFICANT CHANGE UP (ref 5–17)
AST SERPL-CCNC: 18 U/L — SIGNIFICANT CHANGE UP (ref 10–40)
BASOPHILS # BLD AUTO: 0.06 K/UL — SIGNIFICANT CHANGE UP (ref 0–0.2)
BASOPHILS NFR BLD AUTO: 1 % — SIGNIFICANT CHANGE UP (ref 0–2)
BILIRUB SERPL-MCNC: 0.3 MG/DL — SIGNIFICANT CHANGE UP (ref 0.2–1.2)
BUN SERPL-MCNC: 22 MG/DL — SIGNIFICANT CHANGE UP (ref 7–23)
CALCIUM SERPL-MCNC: 10.7 MG/DL — HIGH (ref 8.4–10.5)
CHLORIDE SERPL-SCNC: 109 MMOL/L — HIGH (ref 96–108)
CO2 SERPL-SCNC: 20 MMOL/L — LOW (ref 22–31)
CREAT SERPL-MCNC: 1.15 MG/DL — SIGNIFICANT CHANGE UP (ref 0.5–1.3)
D DIMER BLD IA.RAPID-MCNC: 244 NG/ML DDU — HIGH
EOSINOPHIL # BLD AUTO: 0.18 K/UL — SIGNIFICANT CHANGE UP (ref 0–0.5)
EOSINOPHIL NFR BLD AUTO: 2.9 % — SIGNIFICANT CHANGE UP (ref 0–6)
GLUCOSE BLDC GLUCOMTR-MCNC: 139 MG/DL — HIGH (ref 70–99)
GLUCOSE BLDC GLUCOMTR-MCNC: 144 MG/DL — HIGH (ref 70–99)
GLUCOSE SERPL-MCNC: 165 MG/DL — HIGH (ref 70–99)
HCT VFR BLD CALC: 40.8 % — SIGNIFICANT CHANGE UP (ref 34.5–45)
HGB BLD-MCNC: 13.4 G/DL — SIGNIFICANT CHANGE UP (ref 11.5–15.5)
IMM GRANULOCYTES NFR BLD AUTO: 0.3 % — SIGNIFICANT CHANGE UP (ref 0–1.5)
LYMPHOCYTES # BLD AUTO: 2.33 K/UL — SIGNIFICANT CHANGE UP (ref 1–3.3)
LYMPHOCYTES # BLD AUTO: 36.9 % — SIGNIFICANT CHANGE UP (ref 13–44)
MCHC RBC-ENTMCNC: 29.8 PG — SIGNIFICANT CHANGE UP (ref 27–34)
MCHC RBC-ENTMCNC: 32.8 GM/DL — SIGNIFICANT CHANGE UP (ref 32–36)
MCV RBC AUTO: 90.9 FL — SIGNIFICANT CHANGE UP (ref 80–100)
MONOCYTES # BLD AUTO: 0.45 K/UL — SIGNIFICANT CHANGE UP (ref 0–0.9)
MONOCYTES NFR BLD AUTO: 7.1 % — SIGNIFICANT CHANGE UP (ref 2–14)
NEUTROPHILS # BLD AUTO: 3.27 K/UL — SIGNIFICANT CHANGE UP (ref 1.8–7.4)
NEUTROPHILS NFR BLD AUTO: 51.8 % — SIGNIFICANT CHANGE UP (ref 43–77)
NRBC # BLD: 0 /100 WBCS — SIGNIFICANT CHANGE UP (ref 0–0)
NT-PROBNP SERPL-SCNC: 32 PG/ML — SIGNIFICANT CHANGE UP (ref 0–300)
PLATELET # BLD AUTO: 152 K/UL — SIGNIFICANT CHANGE UP (ref 150–400)
POTASSIUM SERPL-MCNC: 4.5 MMOL/L — SIGNIFICANT CHANGE UP (ref 3.5–5.3)
POTASSIUM SERPL-SCNC: 4.5 MMOL/L — SIGNIFICANT CHANGE UP (ref 3.5–5.3)
PROT SERPL-MCNC: 7.7 G/DL — SIGNIFICANT CHANGE UP (ref 6–8.3)
RBC # BLD: 4.49 M/UL — SIGNIFICANT CHANGE UP (ref 3.8–5.2)
RBC # FLD: 14.9 % — HIGH (ref 10.3–14.5)
SARS-COV-2 RNA SPEC QL NAA+PROBE: SIGNIFICANT CHANGE UP
SODIUM SERPL-SCNC: 144 MMOL/L — SIGNIFICANT CHANGE UP (ref 135–145)
TROPONIN T, HIGH SENSITIVITY RESULT: 8 NG/L — SIGNIFICANT CHANGE UP (ref 0–51)
TROPONIN T, HIGH SENSITIVITY RESULT: 8 NG/L — SIGNIFICANT CHANGE UP (ref 0–51)
WBC # BLD: 6.31 K/UL — SIGNIFICANT CHANGE UP (ref 3.8–10.5)
WBC # FLD AUTO: 6.31 K/UL — SIGNIFICANT CHANGE UP (ref 3.8–10.5)

## 2021-08-09 PROCEDURE — 82962 GLUCOSE BLOOD TEST: CPT

## 2021-08-09 PROCEDURE — 71046 X-RAY EXAM CHEST 2 VIEWS: CPT | Mod: 26

## 2021-08-09 PROCEDURE — 78452 HT MUSCLE IMAGE SPECT MULT: CPT | Mod: 26,ME

## 2021-08-09 PROCEDURE — G1004: CPT

## 2021-08-09 PROCEDURE — 99236 HOSP IP/OBS SAME DATE HI 85: CPT

## 2021-08-09 PROCEDURE — 93010 ELECTROCARDIOGRAM REPORT: CPT

## 2021-08-09 PROCEDURE — 80053 COMPREHEN METABOLIC PANEL: CPT

## 2021-08-09 PROCEDURE — A9500: CPT

## 2021-08-09 PROCEDURE — 93016 CV STRESS TEST SUPVJ ONLY: CPT

## 2021-08-09 PROCEDURE — 99284 EMERGENCY DEPT VISIT MOD MDM: CPT | Mod: 25

## 2021-08-09 PROCEDURE — G0378: CPT

## 2021-08-09 PROCEDURE — U0005: CPT

## 2021-08-09 PROCEDURE — 83880 ASSAY OF NATRIURETIC PEPTIDE: CPT

## 2021-08-09 PROCEDURE — 84484 ASSAY OF TROPONIN QUANT: CPT

## 2021-08-09 PROCEDURE — 85379 FIBRIN DEGRADATION QUANT: CPT

## 2021-08-09 PROCEDURE — 71046 X-RAY EXAM CHEST 2 VIEWS: CPT

## 2021-08-09 PROCEDURE — 78452 HT MUSCLE IMAGE SPECT MULT: CPT | Mod: ME

## 2021-08-09 PROCEDURE — 93017 CV STRESS TEST TRACING ONLY: CPT

## 2021-08-09 PROCEDURE — 93018 CV STRESS TEST I&R ONLY: CPT

## 2021-08-09 PROCEDURE — 71275 CT ANGIOGRAPHY CHEST: CPT | Mod: 26,MA

## 2021-08-09 PROCEDURE — 71275 CT ANGIOGRAPHY CHEST: CPT | Mod: MA

## 2021-08-09 PROCEDURE — U0003: CPT

## 2021-08-09 PROCEDURE — 93005 ELECTROCARDIOGRAM TRACING: CPT | Mod: XU

## 2021-08-09 PROCEDURE — 85025 COMPLETE CBC W/AUTO DIFF WBC: CPT

## 2021-08-09 RX ORDER — DEXTROSE 50 % IN WATER 50 %
25 SYRINGE (ML) INTRAVENOUS ONCE
Refills: 0 | Status: DISCONTINUED | OUTPATIENT
Start: 2021-08-09 | End: 2021-08-12

## 2021-08-09 RX ORDER — METFORMIN HYDROCHLORIDE 850 MG/1
0 TABLET ORAL
Qty: 0 | Refills: 0 | DISCHARGE

## 2021-08-09 RX ORDER — SODIUM CHLORIDE 9 MG/ML
1000 INJECTION, SOLUTION INTRAVENOUS
Refills: 0 | Status: DISCONTINUED | OUTPATIENT
Start: 2021-08-09 | End: 2021-08-12

## 2021-08-09 RX ORDER — INSULIN GLARGINE 100 [IU]/ML
8 INJECTION, SOLUTION SUBCUTANEOUS
Qty: 0 | Refills: 0 | DISCHARGE

## 2021-08-09 RX ORDER — LISINOPRIL 2.5 MG/1
40 TABLET ORAL DAILY
Refills: 0 | Status: DISCONTINUED | OUTPATIENT
Start: 2021-08-09 | End: 2021-08-12

## 2021-08-09 RX ORDER — ALBUTEROL 90 UG/1
2 AEROSOL, METERED ORAL
Qty: 0 | Refills: 0 | DISCHARGE

## 2021-08-09 RX ORDER — AMLODIPINE BESYLATE 2.5 MG/1
10 TABLET ORAL DAILY
Refills: 0 | Status: DISCONTINUED | OUTPATIENT
Start: 2021-08-09 | End: 2021-08-12

## 2021-08-09 RX ORDER — DEXTROSE 50 % IN WATER 50 %
12.5 SYRINGE (ML) INTRAVENOUS ONCE
Refills: 0 | Status: DISCONTINUED | OUTPATIENT
Start: 2021-08-09 | End: 2021-08-12

## 2021-08-09 RX ORDER — INSULIN DETEMIR 100/ML (3)
20 INSULIN PEN (ML) SUBCUTANEOUS
Qty: 0 | Refills: 0 | DISCHARGE

## 2021-08-09 RX ORDER — INSULIN LISPRO 100/ML
VIAL (ML) SUBCUTANEOUS
Refills: 0 | Status: DISCONTINUED | OUTPATIENT
Start: 2021-08-09 | End: 2021-08-12

## 2021-08-09 RX ORDER — MONTELUKAST 4 MG/1
10 TABLET, CHEWABLE ORAL DAILY
Refills: 0 | Status: DISCONTINUED | OUTPATIENT
Start: 2021-08-09 | End: 2021-08-12

## 2021-08-09 RX ORDER — OMEPRAZOLE 10 MG/1
1 CAPSULE, DELAYED RELEASE ORAL
Qty: 0 | Refills: 0 | DISCHARGE

## 2021-08-09 RX ORDER — INSULIN LISPRO 100/ML
1 VIAL (ML) SUBCUTANEOUS
Qty: 0 | Refills: 0 | DISCHARGE

## 2021-08-09 RX ORDER — INSULIN GLARGINE 100 [IU]/ML
17 INJECTION, SOLUTION SUBCUTANEOUS AT BEDTIME
Refills: 0 | Status: DISCONTINUED | OUTPATIENT
Start: 2021-08-09 | End: 2021-08-12

## 2021-08-09 RX ORDER — FLUTICASONE PROPIONATE AND SALMETEROL 50; 250 UG/1; UG/1
1 POWDER ORAL; RESPIRATORY (INHALATION)
Qty: 0 | Refills: 0 | DISCHARGE

## 2021-08-09 RX ORDER — POTASSIUM CHLORIDE 20 MEQ
10 PACKET (EA) ORAL DAILY
Refills: 0 | Status: DISCONTINUED | OUTPATIENT
Start: 2021-08-09 | End: 2021-08-12

## 2021-08-09 RX ORDER — GLUCAGON INJECTION, SOLUTION 0.5 MG/.1ML
1 INJECTION, SOLUTION SUBCUTANEOUS ONCE
Refills: 0 | Status: DISCONTINUED | OUTPATIENT
Start: 2021-08-09 | End: 2021-08-12

## 2021-08-09 RX ORDER — ATORVASTATIN CALCIUM 80 MG/1
1 TABLET, FILM COATED ORAL
Qty: 0 | Refills: 0 | DISCHARGE

## 2021-08-09 RX ORDER — ATORVASTATIN CALCIUM 80 MG/1
20 TABLET, FILM COATED ORAL AT BEDTIME
Refills: 0 | Status: DISCONTINUED | OUTPATIENT
Start: 2021-08-09 | End: 2021-08-12

## 2021-08-09 RX ORDER — AMLODIPINE BESYLATE AND BENAZEPRIL HYDROCHLORIDE 10; 20 MG/1; MG/1
1 CAPSULE ORAL
Qty: 0 | Refills: 0 | DISCHARGE

## 2021-08-09 RX ORDER — INSULIN LISPRO 100/ML
8 VIAL (ML) SUBCUTANEOUS
Refills: 0 | Status: DISCONTINUED | OUTPATIENT
Start: 2021-08-09 | End: 2021-08-12

## 2021-08-09 RX ORDER — RANITIDINE HYDROCHLORIDE 150 MG/1
1 TABLET, FILM COATED ORAL
Qty: 0 | Refills: 0 | DISCHARGE

## 2021-08-09 RX ORDER — METFORMIN HYDROCHLORIDE 850 MG/1
500 TABLET ORAL
Refills: 0 | Status: DISCONTINUED | OUTPATIENT
Start: 2021-08-09 | End: 2021-08-09

## 2021-08-09 RX ORDER — MOMETASONE FUROATE AND FORMOTEROL FUMARATE DIHYDRATE 200; 5 UG/1; UG/1
2 AEROSOL RESPIRATORY (INHALATION)
Qty: 0 | Refills: 0 | DISCHARGE

## 2021-08-09 RX ORDER — ACETAMINOPHEN 500 MG
650 TABLET ORAL ONCE
Refills: 0 | Status: COMPLETED | OUTPATIENT
Start: 2021-08-09 | End: 2021-08-09

## 2021-08-09 RX ORDER — FUROSEMIDE 40 MG
20 TABLET ORAL DAILY
Refills: 0 | Status: DISCONTINUED | OUTPATIENT
Start: 2021-08-09 | End: 2021-08-12

## 2021-08-09 RX ORDER — OMEGA-3 ACID ETHYL ESTERS 1 G
1 CAPSULE ORAL
Qty: 0 | Refills: 0 | DISCHARGE

## 2021-08-09 RX ORDER — EMPAGLIFLOZIN 10 MG/1
1 TABLET, FILM COATED ORAL
Qty: 0 | Refills: 0 | DISCHARGE

## 2021-08-09 RX ORDER — METOPROLOL TARTRATE 50 MG
1 TABLET ORAL
Qty: 0 | Refills: 0 | DISCHARGE

## 2021-08-09 RX ORDER — LATANOPROST 0.05 MG/ML
1 SOLUTION/ DROPS OPHTHALMIC; TOPICAL
Qty: 0 | Refills: 0 | DISCHARGE

## 2021-08-09 RX ORDER — PANTOPRAZOLE SODIUM 20 MG/1
40 TABLET, DELAYED RELEASE ORAL
Refills: 0 | Status: DISCONTINUED | OUTPATIENT
Start: 2021-08-09 | End: 2021-08-12

## 2021-08-09 RX ORDER — DEXTROSE 50 % IN WATER 50 %
15 SYRINGE (ML) INTRAVENOUS ONCE
Refills: 0 | Status: DISCONTINUED | OUTPATIENT
Start: 2021-08-09 | End: 2021-08-12

## 2021-08-09 RX ORDER — POTASSIUM CHLORIDE 20 MEQ
1 PACKET (EA) ORAL
Qty: 0 | Refills: 0 | DISCHARGE

## 2021-08-09 RX ORDER — MONTELUKAST 4 MG/1
1 TABLET, CHEWABLE ORAL
Qty: 0 | Refills: 0 | DISCHARGE

## 2021-08-09 RX ADMIN — Medication 650 MILLIGRAM(S): at 09:50

## 2021-08-09 RX ADMIN — Medication 1: at 10:49

## 2021-08-09 RX ADMIN — ATORVASTATIN CALCIUM 20 MILLIGRAM(S): 80 TABLET, FILM COATED ORAL at 22:02

## 2021-08-09 RX ADMIN — Medication 8 UNIT(S): at 17:56

## 2021-08-09 RX ADMIN — Medication 8 UNIT(S): at 13:58

## 2021-08-09 RX ADMIN — Medication 650 MILLIGRAM(S): at 07:51

## 2021-08-09 RX ADMIN — Medication 30 MILLILITER(S): at 07:51

## 2021-08-09 RX ADMIN — INSULIN GLARGINE 17 UNIT(S): 100 INJECTION, SOLUTION SUBCUTANEOUS at 22:02

## 2021-08-09 NOTE — ED PROVIDER NOTE - ATTENDING CONTRIBUTION TO CARE
I performed a history and physical exam of the patient and discussed their management with the resident. I reviewed the resident's note and agree with the documented findings and plan of care, except as noted.. My medical decision making and observations are found above.

## 2021-08-09 NOTE — ED PROVIDER NOTE - CLINICAL SUMMARY MEDICAL DECISION MAKING FREE TEXT BOX
daniel 70 f htn inc chol, dm with 3 days exertional sscp rad to scap, jaw and left arm subsides w rest -- last stress 2 years ago -- abd soft tn sat 99 afebrile -- concern for acs -- pt took 1 baby at home will give 162 asa -- serial ekg and send biomarkers -- will need provocational study evenif biomarkers neg -

## 2021-08-09 NOTE — ED CDU PROVIDER INITIAL DAY NOTE - FAMILY HISTORY
Mother  Still living? Unknown  Family history of premature coronary heart disease, Age at diagnosis: Age Unknown

## 2021-08-09 NOTE — ED CDU PROVIDER DISPOSITION NOTE - NSFOLLOWUPCLINICS_GEN_ALL_ED_FT
[facility]  Cardiology  [address] [place] [address]  Phone: [phone]  Fax:

## 2021-08-09 NOTE — ED CDU PROVIDER DISPOSITION NOTE - CLINICAL COURSE
71 yo female patient w PMH of DM (on Insulin), HTN, hyperlipidemia who complains of left sided chest pain. pt states that she has been having chest pain for a while but since last night pain became more severe. also noting chest pain episodes becoming more frequent.  pt states that experiencing L sided headache today and L arm paresthesias and pain prompting her to be seen today. Denies fever, n/v, sob/dyspnea  in ED, trops negative. sent to cdu for nuc stress and tele monitoring. 71 yo female patient w PMH of DM (on Insulin), HTN, hyperlipidemia who complains of left sided chest pain. pt states that she has been having chest pain for a while but since last night pain became more severe. also noting chest pain episodes becoming more frequent.  pt states that experiencing L sided headache today and L arm paresthesias and pain prompting her to be seen today. Denies fever, n/v, sob/dyspnea  in ED, trops negative. sent to cdu for nuc stress and tele monitoring.  In CDU, normal stress, mildly elev dimer->CTA neg, ambulatory sat 98 upon DC. ED attending Michelle aware. Stable for DC. All labs, imaging and DC instructions d/w pt and daughter. All questions answered.

## 2021-08-09 NOTE — ED ADULT NURSE REASSESSMENT NOTE - NS ED NURSE REASSESS COMMENT FT1
Repeat . Per PA, 8 units of insulin lispro to be given once patient has her lunch tray. Pt provided menu to order lunch at this time.

## 2021-08-09 NOTE — ED CDU PROVIDER DISPOSITION NOTE - PATIENT PORTAL LINK FT
You can access the FollowMyHealth Patient Portal offered by [facility] by registering at the following website: [email or web address]. By joining [facility]’s FollowMyHealth portal, you will also be able to view your health information using other applications (apps) compatible with our system.

## 2021-08-09 NOTE — ED CDU PROVIDER DISPOSITION NOTE - ATTENDING CONTRIBUTION TO CARE
Attending MD Martinez:   I personally have seen and examined this patient.  Physician assistant note reviewed and agree on plan of care and except where noted.  See below for details.     seen in [place] [facility], accompanied by daughter    70F with PMH/PSH including DM, HTN, HLD sent to the CDU after presenting to the ED with chest pain.  Reports to this MD that her reason for presenting to the ED is because of shortness of breath.  Denies chest pain at present or during stress.  Reports is having persistent shortness of breath, worse with exertion.  Denies abdominal pain, nausea, vomiting, diarrhea, bloody or black stools. Denies urinary complaints.  Denies fevers, chills, cough.  Reports sometimes has LE edema but is bilateral. Patient not tachycardic on review of HR and is sat'ing >95% on RA.  Denies unilateral leg swelling, hemoptysis, recent surgery or trauma, prior PE or DVT, hormone use. A ten (10) point review of systems was negative other than as stated in the HPI or elsewhere in the chart.     Exam:   General: NAD  HENT: head NCAT, airway patent with moist mucous membranes  Eyes: PERRL  Lungs: lungs CTAB with good inspiratory effort, no wheezing, no rhonchi, no rales  Cardiac: +S1S2, no m/r/g, bilateral LE edema, +2 DPs  GI: abdomen soft with +BS, NT, ND  : no CVAT  MSK: FROM at neck, no calf tenderness, erythema or warmth, no asymmetry  Neuro: moving all extremities spontaneously, sensory grossly intact, no gross neuro deficits  Psych: normal mood and affect     A/P: 70F with documented reported chest pain, now reporting only shortness of breath at time of this MDs interview.  Given eval for ACS with stress (normal) and eval for PE/PNA with CTA (nonactionable), No acute issues at  this time.  Lab and radiology tests reviewed with patient and family.  Patient stable for discharge. Follow up instructions given, importance of follow up emphasized, return to ED parameters reviewed and patient verbalized understanding.  All questions answered, all concerns addressed.

## 2021-08-09 NOTE — ED CDU PROVIDER INITIAL DAY NOTE - OBJECTIVE STATEMENT
69 yo female patient w PMH of DM (on Insulin), HTN, hyperlipidemia who complains of left sided chest pain. pt states that she has been having chest pain for a while but since last night pain became more severe. also noting chest pain episodes becoming more frequent.  pt states that experiencing L sided headache today and L arm paresthesias and pain prompting her to be seen today. Denies fever, n/v, sob/dyspnea

## 2021-08-09 NOTE — ED ADULT NURSE REASSESSMENT NOTE - NS ED NURSE REASSESS COMMENT FT1
Received pt awake alert and orientedx4 Resp even and nonlab Denies chest pain or sob. FS with coverage done as ordered. Daughter at bedside.

## 2021-08-09 NOTE — ED ADULT NURSE NOTE - OBJECTIVE STATEMENT
69 yo female complaining of chest pain from center of the chest radiating to left side since yesterday "I have been tired, just having some numbness for months, I get tired walking, just not feeling great and since last night I have a headache and chest pain" pt alerted and oriented x3, no signs of acute distress noted, vitals WNL. Daughter at the bedside, pt obese, IV placed on left arm 20 G, blood drawn, sent to lab, Pt connected to cardiac monitoring, EKG done and repeated, will continue to monitor closely.

## 2021-08-09 NOTE — ED PROVIDER NOTE - OBJECTIVE STATEMENT
69 yo female patient w PMH of DM, HTN, hyperlipidemia who complains of left sided chest pain. Patient refers that for the past 2 days patient has been experiencing exertional and non exertional chest pain and SOB that is accompanied with palpitations and diaphoresis. States feeling left sided generalized body pain and left sided HA with blurry vision for 1 day. Positive for increased urinary frequency and decreased sleep quality. Denies fever, chills, cough, abdominal pain, n/v, bowel changes, changes in gait or changes in appetite.

## 2021-08-09 NOTE — ED ADULT NURSE NOTE - CAS ELECT INFOMATION PROVIDED
March 11, 2019      Diallo Kruse, FNP  [address]  [address]  [facility] [address]           [place] - Neurosurgery  [address]  [facility] [address]  Phone: [phone]          Patient: Mariann Huff   MR Number: [id number]   YOB: 1960   Date of Visit: 3/11/2019       Dear Diallo Kruse:    Thank you for referring Mariann Huff to me for evaluation. Attached you will find relevant portions of my assessment and plan of care.    If you have questions, please do not hesitate to call me. I look forward to following Mariann Huff along with you.    Sincerely,    Mk George MD    Enclosure  CC:  No Recipients    If you would like to receive this communication electronically, please contact [email or web address] or [phone] to request more information on [facility] access.    For providers and/or their staff who would like to refer a patient to [facility], please contact us through our one-stop-shop provider referral line, Esmer Campbell, at [phone].    If you feel you have received this communication in error or would no longer like to receive these types of communications, please e-mail [email or web address]          DC instructions

## 2021-08-09 NOTE — ED PROVIDER NOTE - NS ED ROS FT
CONST: no fevers, no chills. Positive diaphoresis and HA  EYES: no pain. Positive for blurry vision  ENT: no sore throat, no ear pain, no change in hearing  CV: Positive for left sided chest pain, palpitations and BL leg edema  RESP:  no cough. Positive for SOB.  ABD: no abdominal pain, no nausea, no vomiting, no diarrhea  : no dysuria, no flank pain, no hematuria. Positive for increased frequency  MSK: no back pain. Positive for left sided generalized body pain  NEURO: Positive for left sided HA. Numbness in R thigh and L shoulder and head  SKIN:  no rash

## 2021-08-09 NOTE — ED CDU PROVIDER DISPOSITION NOTE - NSFOLLOWUPINSTRUCTIONS_ED_ALL_ED_FT
1) Follow-up with your primary care provider in 1-2 days.      Follow-up with your cardiologist within 1 week. If you are unable to be seen follow-up at [facility].    [facility]  Cardiology  [address] [place] [address]  Phone: [phone]    2) Continue to take all medications as prescribed.    3) Rest and drink plenty of fluids. Pain can be managed with Acetaminophen (aka Tylenol) and Ibuprofen (aka Motrin or Advil) over the counter as directed. Take with food.    4) Return to the ER for any new or worsening symptoms.

## 2021-08-09 NOTE — ED CDU PROVIDER INITIAL DAY NOTE - PROGRESS NOTE DETAILS
VSS. No events on tele. Normal stress, mildly elev dimer->CTA neg, ambulatory sat 98 upon DC. ED attending Michelle aware. Stable for DC. All labs, imaging and DC instructions d/w pt and daughter. All questions answered. Attending MD Martinez: Reviewed results of labs and stress with patient and daughter.  Reports that her chief complaint is shortness of breath which is still present.  Will obtain dimer, BNP, pending dimer, CTA Chest.

## 2021-08-09 NOTE — ED CDU PROVIDER INITIAL DAY NOTE - MEDICAL DECISION MAKING DETAILS
daniel - 70 w mult med problems htn high chol and dm with exertional cp -- 1st trop indetermin (8) -- serial ekg and biomarkers and provocative testing -

## 2021-08-09 NOTE — ED PROVIDER NOTE - PHYSICAL EXAMINATION
GENERAL: Awake, alert, NAD  HEENT: NC/AT, moist mucous membranes, PERRL, EOMI  LUNGS: CTAB, no wheezes or crackles   CARDIAC: RRR, no m/r/g. No chest wall tenderness  ABDOMEN: Soft, normal BS, non tender, non distended, no rebound, no guarding  EXT: BL leg edema, no calf tenderness, 2+ DP pulses bilaterally, no deformities.  NEURO: A&Ox3. Moving all extremities. CNs III-XII intact  SKIN: Warm and dry. No rash.

## 2021-12-08 NOTE — H&P PST ADULT - NS ABD PE RECTAL EXAM
12/8/2021  Marlin is a 10 year old female.     Visit Primary Payor: [employer] / Plan: [id number] / Product Type: COMM    CHIEF COMPLAINT     Chief Complaint   Patient presents with   • Eye Exam     Left eye amblyopia. She said her glasses are broken and she has been without them for awhile. She is not having any trouble seeing for distance or near.   • Office Visit       HISTORY OF PRESENT ILLNESS     HPI     Eye Exam     In both eyes.  Characterized as stable.  Vision Correction none.  Last Eye Exam was 1 year ago. Additional comments: Left eye amblyopia. She said her glasses are broken and she has been without them for awhile. She is not having any trouble seeing for distance or near.              Comments     Mom said it is ok to dilate today.                VITALS   There were no vitals taken for this visit.    OTHER SIGNIFICANT PROBLEMS     Patient Active Problem List    Diagnosis Date Noted   • Impacted cerumen 12/23/2013     Priority: Low     This patient would not tolerate cerumen micro-debridement in the clinic setting.         PAST MEDICAL, FAMILY AND SOCIAL HISTORY     Medications:  No current outpatient medications on file.     No current facility-administered medications for this visit.       Allergies:  ALLERGIES:  No Known Allergies    Past Medical  History/Surgeries:  History reviewed. No pertinent past medical history.  Past Surgical History:   Procedure Laterality Date   • Myringotomy w/ tubes  02/28/2013   • Repair intermed wnd rest body 7.6-12.5  12/21/2015    suture neck wound, pit bull bite.       Family History:  History reviewed. No pertinent family history.    Social History:  Social History     Tobacco Use   • Smoking status: Never Smoker   • Smokeless tobacco: Never Used   Substance Use Topics   • Alcohol use: No       REVIEW OF SYSTEMS     ROS     Negative for: Constitutional, Gastrointestinal, Neurological, Skin, Genitourinary, Musculoskeletal, HENT, Endocrine, Cardiovascular, Eyes,  Respiratory, Psychiatric, Allergic/Imm, Heme/Lymph    Last edited by Lupis Hatfield on 12/8/2021  8:19 AM. (History)          PHYSICAL EXAM     Base Eye Exam     Visual Acuity (Snellen - Linear)       Right Left    Dist sc 20/25 -1 20/50 -2    Dist ph sc  20/25-2 +2    Near sc 20/40 20/25          Tonometry (Non-contact air puff, 8:16 AM)       Right Left    Pressure 18 18          Pupils       Pupils APD    Right PERRL Negative    Left PERRL Negative          Visual Fields (Counting fingers)       Left Right     Full Full          Extraocular Movement       Right Left     Full, Ortho Full, Ortho          Neuro/Psych     Oriented x3: Yes    Mood/Affect: Normal          Dilation     Both eyes: 1.0% Tropicamide @ 8:30 AM            Additional Tests     Color       Right Left    Ishihara 11/13 11/13          Keratometry (Automated)       K1 Axis K2 Axis    Right 40.75 135 41.25 045    Left 41.00 035 41.50 125          Stereo     Circles: 9/9            Strabismus Exam     Method: Alternate Cover    Observations: Ortho    Distance Near Near +3DS N Bifocals    Ortho  Ortho                Slit Lamp and Fundus Exam     External Exam       Right Left    External Normal Normal          Slit Lamp Exam       Right Left    Lids/Lashes Normal Normal    Conjunctiva/Sclera Clear Clear    Cornea Clear Clear    Anterior Chamber Deep and quiet Deep and quiet    Iris Round and regular Round and regular    Lens Clear Clear          Fundus Exam       Right Left    Vitreous Clear Clear    Disc Flat, pink with a healthy rim Flat, pink with a healthy rim    C/D Ratio Vertical .2 .2    C/D Ratio Horizontal .2 .2    Macula Healthy with sharp foveal reflex Healthy with sharp foveal reflex    Vessels Healthy with normal Artery-Vein ratio Healthy with normal Artery-Vein ratio    Periphery Flat, healthy, without tears or detachments Flat, healthy, without tears or detachments            Refraction     Wearing Rx       Sphere Cylinder Axis     Right +2.00 -0.50 115    Left +2.75 -0.75 085    Pupillary Distance: 56.5          Manifest Refraction       Sphere Cylinder Gillespie Dist VA Near VA    Right +2.25 -0.75 110 20/20 20/20    Left +3.00 -0.50 054 20/40 20/25          Final Rx       Sphere Cylinder Gillespie Dist VA Near VA    Right +2.25 -0.75 110 20/20 20/20    Left +3.00 -0.50 054 20/40 20/25    Expiration Date: 12/9/2022              Her eyes were dilated as part of this examination.  She has been educated that she will be light sensitive and have blurry near vision for 4-6 hours.    ASSESSMENT     1. Hyperopia of both eyes with astigmatism    2. Refractive amblyopia of left eye        PLAN   1,2.  Recommended updating glasses prescription and educated on adaptation to new prescription.      Return in about 1 year (around 12/8/2022) for routine eye exam.    Nisa Whitfield, OD  [address]  [place] [address] [phone]   patient refused

## 2022-01-19 ENCOUNTER — TRANSCRIPTION ENCOUNTER (OUTPATIENT)
Age: 72
End: 2022-01-19

## 2022-05-25 NOTE — ED CDU PROVIDER INITIAL DAY NOTE - NS ED MD PROGRESS NOTE ADD
From: Yvonne Alexander  To: Vicki Lutz MD  Sent: 5/24/2022 6:30 PM CDT  Subject: Question regarding Ultrasound Scan Abdomen    Not sure what these results mean? Add Progress Note...

## 2022-12-21 ENCOUNTER — OFFICE (OUTPATIENT)
Dept: URBAN - METROPOLITAN AREA CLINIC 70 | Facility: CLINIC | Age: 72
Setting detail: OPHTHALMOLOGY
End: 2022-12-21
Payer: COMMERCIAL

## 2022-12-21 DIAGNOSIS — Z96.1: ICD-10-CM

## 2022-12-21 DIAGNOSIS — H16.223: ICD-10-CM

## 2022-12-21 DIAGNOSIS — H31.003: ICD-10-CM

## 2022-12-21 DIAGNOSIS — H40.1131: ICD-10-CM

## 2022-12-21 PROCEDURE — 92012 INTRM OPH EXAM EST PATIENT: CPT | Performed by: OPHTHALMOLOGY

## 2022-12-21 PROCEDURE — 76512 OPH US DX B-SCAN: CPT | Performed by: OPHTHALMOLOGY

## 2022-12-21 ASSESSMENT — SUPERFICIAL PUNCTATE KERATITIS (SPK)
OD_SPK: 1+
OS_SPK: 1+

## 2022-12-21 ASSESSMENT — REFRACTION_AUTOREFRACTION
OD_CYLINDER: -1.75
OD_SPHERE: +0.75
OS_CYLINDER: -1.50
OS_AXIS: 093
OS_SPHERE: +1.25
OD_AXIS: 065

## 2022-12-21 ASSESSMENT — KERATOMETRY
OS_AXISANGLE_DEGREES: 101
OS_K2POWER_DIOPTERS: 44.75
OD_K1POWER_DIOPTERS: 43.25
OD_K2POWER_DIOPTERS: 44.25
OD_AXISANGLE_DEGREES: 160
OS_K1POWER_DIOPTERS: 44.00

## 2022-12-21 ASSESSMENT — REFRACTION_CURRENTRX
OS_SPHERE: +1.00
OS_AXIS: 092
OS_VPRISM_DIRECTION: PROGS
OS_OVR_VA: 20/
OS_VPRISM_DIRECTION: PROGS
OD_ADD: +2.50
OS_OVR_VA: 20/
OS_AXIS: 088
OD_CYLINDER: -0.75
OS_ADD: +2.50
OD_SPHERE: +0.50
OS_CYLINDER: -0.50
OD_SPHERE: +0.25
OS_CYLINDER: -1.00
OD_AXIS: 069
OD_ADD: +2.25
OS_ADD: +2.25
OD_OVR_VA: 20/
OD_CYLINDER: -1.00
OD_VPRISM_DIRECTION: PROGS
OS_SPHERE: +0.50
OD_AXIS: 079
OD_VPRISM_DIRECTION: PROGS
OD_OVR_VA: 20/

## 2022-12-21 ASSESSMENT — VISUAL ACUITY
OD_BCVA: 20/20
OS_BCVA: 20/20

## 2022-12-21 ASSESSMENT — AXIALLENGTH_DERIVED
OS_AL: 23.0868
OD_AL: 23.5489

## 2022-12-21 ASSESSMENT — SPHEQUIV_DERIVED
OS_SPHEQUIV: 0.5
OD_SPHEQUIV: -0.125

## 2022-12-21 ASSESSMENT — CONFRONTATIONAL VISUAL FIELD TEST (CVF)
OD_FINDINGS: FULL
OS_FINDINGS: FULL

## 2023-03-08 NOTE — PROGRESS NOTE ADULT - PROBLEM SELECTOR PLAN 5
Render In Strict Bullet Format?: No
Initiate Treatment: Cephalexin twice daily for 7 days.
Detail Level: Zone
Initiate Treatment: Triamcinolone cream
Cont cpap at night time

## 2023-05-12 NOTE — H&P PST ADULT - ALCOHOL USE HISTORY SINGLE SELECT
Infusion Nursing Note:  Laura Gonzalez presents today for IV hydration (1L LR).    Patient seen by provider today: No   present during visit today: Not Applicable.    Note: Laura reports she is currently experiencing a pancreatitis flare.  She reports she had significant epigastric pain yesterday.  She states the pain has let up a little bit since yesterday and today is a bit more dull.  She reports she has been taking Tramadol PRN for pain and this has been helpful.    Patient requested only 1L LR today, infused over 1 hour.  She is scheduled for IVF on Saturday and Sunday in [place].    Intravenous Access:  Peripheral IV placed.    Treatment Conditions:   05/12/23 14:10   Lipase 49       Post Infusion Assessment:  Patient tolerated infusion without incident.  Blood return noted pre and post infusion.  Site patent and intact, free from redness, edema or discomfort.  No evidence of extravasations.  Access discontinued per protocol.     Discharge Plan:   Discharge instructions reviewed with: Patient.  Patient and/or family verbalized understanding of discharge instructions and all questions answered.  AVS to patient via [employer].  Patient will return 5/13 in [place] for next appointment.  Patient discharged in stable condition accompanied by: self.  Departure Mode: Ambulatory.      Supa Jefferson RN   never

## 2023-09-15 NOTE — H&P PST ADULT - PAIN, FACTORS THAT RELIEVE, PROFILE
Inflammatory markers improved on Rituximab every 6 mo and prednisone 2.5mg po daily   -pt now with enthesitis    medications/exercises

## 2024-03-24 ENCOUNTER — NON-APPOINTMENT (OUTPATIENT)
Age: 74
End: 2024-03-24

## 2024-09-06 ENCOUNTER — APPOINTMENT (OUTPATIENT)
Dept: ORTHOPEDIC SURGERY | Facility: CLINIC | Age: 74
End: 2024-09-06
Payer: COMMERCIAL

## 2024-09-06 VITALS
DIASTOLIC BLOOD PRESSURE: 71 MMHG | WEIGHT: 225 LBS | HEIGHT: 66 IN | HEART RATE: 78 BPM | SYSTOLIC BLOOD PRESSURE: 133 MMHG | BODY MASS INDEX: 36.16 KG/M2

## 2024-09-06 DIAGNOSIS — M54.50 LOW BACK PAIN, UNSPECIFIED: ICD-10-CM

## 2024-09-06 DIAGNOSIS — M16.12 UNILATERAL PRIMARY OSTEOARTHRITIS, LEFT HIP: ICD-10-CM

## 2024-09-06 PROCEDURE — 99203 OFFICE O/P NEW LOW 30 MIN: CPT

## 2024-09-06 PROCEDURE — 73502 X-RAY EXAM HIP UNI 2-3 VIEWS: CPT | Mod: LT

## 2024-09-06 PROCEDURE — 72100 X-RAY EXAM L-S SPINE 2/3 VWS: CPT

## 2024-09-06 RX ORDER — MELOXICAM 7.5 MG/1
7.5 TABLET ORAL DAILY
Qty: 14 | Refills: 0 | Status: ACTIVE | COMMUNITY
Start: 2024-09-06 | End: 1900-01-01

## 2024-12-19 NOTE — H&P PST ADULT - MS EXT PE MLT D E PC
Physical Therapy Visit    Visit Type: Daily Treatment Note  Visit: 3  Referring Provider: Anderson Thomas DO  Medical Diagnosis (from order): M75.101 - Rotator cuff syndrome of right shoulder  M75.21 - Biceps tendinitis of right shoulder     SUBJECTIVE                                                                                                               He states his pain is at right anterior shoulder today. He also notes pain at right shoulder blade area today. He states pain is at 4-5/10. He states he continues to have right shoulder pain with reaching. He states minimal pain at right shoulder with increased movement at right upper extremity.  He continues to perform his exercises at home.       OBJECTIVE                                                                                                                    Observation   Guarding present at right upper extremity during passive range of motion with constant cueing to relax right upper extremity to improve passive mobility.     Reduced scapular retraction and shoulder extension beyond plane of body with prone shoulder extension with scapular retraction. Better form following tactile and verbal cueing.         Palpation  Min-moderate soft tissue restriction at right rhomboids with tenderness to palpation.                    Treatment     Therapeutic Exercise  Today's Exercises:    shoulder passive range of motion   Supine shoulder flexion with dowel nicolle 2x10  Right shoulder table slides into abduction x15  Right shoulder wall slides into flexion 2x10  Right shoulder External rotation with dowel nicolle 2x10 -deferred this session   Standing shoulder abduction with dowel nicolle 2x10  -deferred this session   Ball on plinth shoulder horizontal abduction/adduction and circles x15 each   Bicep curls 8 pounds 2x10  New:  Seated shoulder slides into scaption at 30 degree incline x15    Future progressions:   Pectoral doorway stretch    Supine shoulder butterfly x15    Seated on bench 45 degrees shoulder flexion with med ball   Ball around head and waist x10 each way  3 way shoulder raise x10       Manual Therapy   Soft tissue mobilization to right shoulder to reduce pain and improve shoulder mobility. Right Glenohumeral anterior posterior glide and caudal glide mobilization. Subscapularis release. Sidelying scapular upglide mobilization.     Neuromuscular Re-Education  Neuromuscular Re-education to improve quality of motion , improve posture and postural awareness, improve proprioception, improve coordination , and improve kinesthetic sense:      Supine right shoulder rhythmic stabilization in 90 degrees flexion 3x20 seconds    Supine Serratus Punches with red theraband 2x12    Seated on bench 30 degrees chest press with med ball 4 pounds 2x10  Rows at speed pulley 20 pounds 2x10  Shoulder extension with red theraband 3x10   Shoulder External Rotation with yellow theraband 3x10  Shoulder Internal Rotation with red theraband 3x10    Prone on plinth shoulder extension with scapular retraction 2x10 with 3 second hold  New:  Ball on wall stability (up/down, side/side, and circles) with playball x10 each  Supine Shoulder horizontal abduction with red theraband 2x10  Bilateral shoulder External rotation with looped red theraband walk in back of gym x3 laps  Dowel nicolle landmine press on 42 inch box 2x10     Further progressions:   Standing chin tuck at wall   Tristan carry       Standing D2 flexion band  Scapular clocks at wall with orange band 3x5      carry with band   Shoulder alphabet at shoulder height in flexion and scaption with peach band x2 each   Spiderman at wall with peach band and lift off at top 2x5  Body blade 2x30 seconds     Cones at shelf leapfrog flexion and abduction x2 each     Putty twists with knob in pink putty (simulate opening jars) 2x10    Right shoulder 90/90 External rotation with peach band 2x8  Latissimus pull down with blue band 3x10  Wall push  up  Shoulder taps at wall or high box        Skilled input: verbal instruction/cues, tactile instruction/cues and posture correction    Writer verbally educated and received verbal consent for hand placement, positioning of patient, and techniques to be performed today from patient for clothing adjustments for techniques, therapist position for techniques and hand placement and palpation for techniques as described above and how they are pertinent to the patient's plan of care.  Home Exercise Program  Exercise description and pictures were provided to assist with option to review videos via online access at [email or web address]. Patient encouraged to hold on any exercises if unsure how to complete them correctly and to follow up next visit with further questions. Patient demonstrated understanding of all exercises and was given all materials necessary to complete them independently.      Access Code: [email or web address]  URL: [email or web address]  Date: 12/03/2024  Prepared by: Tj Sheth    Exercises  - Seated Shoulder External Rotation AAROM with Cane and Hand in Neutral  - 2-3 x daily - 7 x weekly - 2 sets - 10 reps - 3 seconds hold  - Seated Shoulder Abduction Towel Slide at Table Top  - 2 x daily - 7 x weekly - 1 sets - 15 reps  - Shoulder Flexion Wall Slide with Towel  - 2 x daily - 7 x weekly - 1 sets - 5 reps  - Seated Scapular Retraction  - 3 x daily - 7 x weekly - 1 sets - 20 reps - 3 seconds hold  - Shoulder extension with resistance - Neutral  - 1 x daily - 4 x weekly - 2-3 sets - 10 reps  - Supine Serratus Punches Resistance  - 1 x daily - 3-4 x weekly - 2-3 sets - 10 reps      ASSESSMENT                                                                                                            Patient continues to experience pain at anterior right shoulder and posterior shoulder along scapula. Pain at right scapular reported toward end of range with passive right shoulder External  rotation and Internal Rotation. Patient progressed to seated right shoulder scaption slide at 30 degree incline with cueing needed to reduce upper trap over activation. Patient challenged with scapular retraction with prone on plinth shoulder extension with scapular retraction with some improvement with form and muscle control following instruction. Progressed patient to supine shoulder horizontal abduction with theraband to improve scapular stability with cueing needed to improve scapular retraction with exercise. Patient still minimally challenged with right shoulder stability with supine shoulder rhythmic stabilization in 90 degrees flexion. Tactile cueing needed to improve serratus anterior control with supine serratus punch with theraband and dowel nicolle lanmine press on high box. Instruction needed during bilateral shoulder External rotation  with looped theraband walk to improve shoulder External rotation against theraband to improve shoulder stability and endurance. Patient reported no aggravation of right shoulder pain with exercises but continued to have 4-5/10 pain at end of session today. Further scapular and shoulder strengthening is needed to improve shoulder stability to reduce pain with reaching activities.   Education:   - Results of above outlined education: Verbalizes understanding and Demonstrates understanding    PLAN                                                                                                                           Suggestions for next session as indicated: Progress per plan of care to address remaining impairments for progression towards skilled physical therapy goals and to improve patient's functional ability.          Therapy procedure time and total treatment time can be found documented on the Time Entry flowsheet     pedal edema

## 2025-03-17 ENCOUNTER — OFFICE (OUTPATIENT)
Facility: LOCATION | Age: 75
Setting detail: OPHTHALMOLOGY
End: 2025-03-17
Payer: COMMERCIAL

## 2025-03-17 DIAGNOSIS — H16.223: ICD-10-CM

## 2025-03-17 DIAGNOSIS — Z96.1: ICD-10-CM

## 2025-03-17 DIAGNOSIS — H40.1131: ICD-10-CM

## 2025-03-17 PROCEDURE — 99214 OFFICE O/P EST MOD 30 MIN: CPT | Performed by: OPHTHALMOLOGY

## 2025-03-17 ASSESSMENT — REFRACTION_CURRENTRX
OD_OVR_VA: 20/
OD_VPRISM_DIRECTION: PROGS
OD_AXIS: 079
OD_CYLINDER: -1.00
OS_AXIS: 094
OS_ADD: +2.25
OS_CYLINDER: -1.00
OD_AXIS: 079
OD_ADD: +2.25
OS_SPHERE: +1.00
OS_VPRISM_DIRECTION: PROGS
OD_VPRISM_DIRECTION: PROGS
OD_OVR_VA: 20/
OS_ADD: +2.50
OS_SPHERE: +1.00
OS_OVR_VA: 20/
OS_CYLINDER: -1.00
OD_ADD: +2.50
OS_AXIS: 092
OS_OVR_VA: 20/
OD_CYLINDER: -1.25
OS_VPRISM_DIRECTION: PROGS
OD_SPHERE: +1.00
OD_SPHERE: +0.50

## 2025-03-17 ASSESSMENT — REFRACTION_AUTOREFRACTION
OD_AXIS: 074
OS_CYLINDER: -1.75
OD_CYLINDER: -1.75
OS_SPHERE: +1.00
OD_SPHERE: +0.50
OS_AXIS: 086

## 2025-03-17 ASSESSMENT — KERATOMETRY
OD_K1POWER_DIOPTERS: 43.50
OD_AXISANGLE_DEGREES: 171
OS_AXISANGLE_DEGREES: 008
OD_K2POWER_DIOPTERS: 44.25
OS_K2POWER_DIOPTERS: 45.00
OS_K1POWER_DIOPTERS: 44.00

## 2025-03-17 ASSESSMENT — REFRACTION_MANIFEST
OD_ADD: +2.50
OS_AXIS: 095
OD_AXIS: 075
OD_CYLINDER: -1.25
OD_SPHERE: +1.00
OS_ADD: +2.50
OS_SPHERE: +1.00
OS_CYLINDER: -1.00

## 2025-03-17 ASSESSMENT — VISUAL ACUITY
OS_BCVA: 20/20
OD_BCVA: 20/20

## 2025-03-17 ASSESSMENT — TONOMETRY
OD_IOP_MMHG: 14
OS_IOP_MMHG: 14

## 2025-03-17 ASSESSMENT — SUPERFICIAL PUNCTATE KERATITIS (SPK)
OS_SPK: 1+
OD_SPK: 1+

## 2025-03-17 ASSESSMENT — CONFRONTATIONAL VISUAL FIELD TEST (CVF)
OS_FINDINGS: FULL
OD_FINDINGS: FULL

## 2025-05-17 ENCOUNTER — NON-APPOINTMENT (OUTPATIENT)
Age: 75
End: 2025-05-17

## 2025-05-19 NOTE — ED ADULT TRIAGE NOTE - HEIGHT IN CM
Parisa from [facility] pharmacy  calling with questions/issues in regards to:   Per Parisa requesting a call back to go over patient current medications list. Please call [phone]   [phone]